# Patient Record
Sex: FEMALE | Race: WHITE | HISPANIC OR LATINO | Employment: FULL TIME | URBAN - METROPOLITAN AREA
[De-identification: names, ages, dates, MRNs, and addresses within clinical notes are randomized per-mention and may not be internally consistent; named-entity substitution may affect disease eponyms.]

---

## 2017-01-02 RX ORDER — DROSPIRENONE AND ETHINYL ESTRADIOL 0.03MG-3MG
1 KIT ORAL DAILY
Qty: 84 TABLET | Refills: 3 | Status: SHIPPED | OUTPATIENT
Start: 2017-01-02 | End: 2017-12-11

## 2017-01-02 NOTE — TELEPHONE ENCOUNTER
HARIS 3-0.03MG TABS      Last Written Prescription Date: 10/12/2016  Last Fill Quantity: 84,  # refills: 0   Last Office Visit with [facility], [facility] or [facility] prescribing provider: 12/9/2015

## 2017-01-20 DIAGNOSIS — F41.9 ANXIETY: Primary | ICD-10-CM

## 2017-01-20 NOTE — TELEPHONE ENCOUNTER
Lorazepam      Last Written Prescription Date: 1/14/16  Last Fill Quantity: 20,  # refills: 0   Last Office Visit with [facility] or [facility] prescribing provider: 12/9/15    Michelle Quintana, PharmD  [facility]  On behalf of [facility]

## 2017-01-23 RX ORDER — LORAZEPAM 1 MG/1
1 TABLET ORAL EVERY 8 HOURS PRN
Qty: 20 TABLET | Refills: 0 | Status: SHIPPED | OUTPATIENT
Start: 2017-01-23 | End: 2020-08-19

## 2017-06-14 DIAGNOSIS — F41.1 GAD (GENERALIZED ANXIETY DISORDER): ICD-10-CM

## 2017-06-14 NOTE — TELEPHONE ENCOUNTER
Wellbutrin       Last Written Prescription Date: 12/26/2016  Last Fill Quantity: 90; # refills: 1  Last Office Visit with [facility], [facility] or [facility] prescribing provider:  12/9/2015        Last PHQ-9 score on record=   PHQ-9 SCORE 6/29/2016   Total Score -   Total Score MyChart -   Total Score 1       No results found for: AST  No results found for: ALT

## 2017-06-14 NOTE — TELEPHONE ENCOUNTER
buPROPion (WELLBUTRIN XL) 300 MG 24 hr tablet       Last Written Prescription Date: 12/26/2016  Last Fill Quantity: 90 tablet; # refills: 1  Last Office Visit with [facility], [facility] or [facility] prescribing provider:  12/9/2015        Last PHQ-9 score on record=   PHQ-9 SCORE 6/29/2016   Total Score -   Total Score MyChart -   Total Score 1       No results found for: AST  No results found for: ALT

## 2017-06-15 NOTE — TELEPHONE ENCOUNTER
Due for an OV     Called # [phone]    Pt stated she will call the clinic back to schedule this appointment     Awaiting for appointment to be made - once made Rn can fill for 30 days     Amita Livingston RN, BSN  [facility]

## 2017-06-22 RX ORDER — BUPROPION HYDROCHLORIDE 300 MG/1
300 TABLET ORAL EVERY MORNING
Qty: 30 TABLET | Refills: 0 | Status: SHIPPED | OUTPATIENT
Start: 2017-06-22 | End: 2017-07-13

## 2017-06-22 NOTE — TELEPHONE ENCOUNTER
Next 5 appointments (look out 90 days)     Jul 13, 2017 10:00 AM CDT   MyChart Physical Adult with Karen Weiler, MD   [facility]    [address] [address] [phone]                Due for an Office visit for further refills, only fill for 30 days     Amita Livingston RN, BSN  [facility]

## 2017-07-13 ENCOUNTER — OFFICE VISIT (OUTPATIENT)
Dept: FAMILY MEDICINE | Facility: CLINIC | Age: 27
End: 2017-07-13
Payer: COMMERCIAL

## 2017-07-13 VITALS
WEIGHT: 246 LBS | BODY MASS INDEX: 38.61 KG/M2 | DIASTOLIC BLOOD PRESSURE: 76 MMHG | HEART RATE: 92 BPM | TEMPERATURE: 97.2 F | SYSTOLIC BLOOD PRESSURE: 129 MMHG | HEIGHT: 67 IN

## 2017-07-13 DIAGNOSIS — F32.0 MILD MAJOR DEPRESSION (H): ICD-10-CM

## 2017-07-13 DIAGNOSIS — F41.1 GAD (GENERALIZED ANXIETY DISORDER): ICD-10-CM

## 2017-07-13 DIAGNOSIS — Z13.6 CARDIOVASCULAR SCREENING; LDL GOAL LESS THAN 160: ICD-10-CM

## 2017-07-13 DIAGNOSIS — Z00.00 ROUTINE GENERAL MEDICAL EXAMINATION AT A HEALTH CARE FACILITY: Primary | ICD-10-CM

## 2017-07-13 DIAGNOSIS — E66.01 MORBID OBESITY DUE TO EXCESS CALORIES (H): ICD-10-CM

## 2017-07-13 LAB
ERYTHROCYTE [DISTWIDTH] IN BLOOD BY AUTOMATED COUNT: 13.3 % (ref 10–15)
HCT VFR BLD AUTO: 39 % (ref 35–47)
HGB BLD-MCNC: 12.6 G/DL (ref 11.7–15.7)
MCH RBC QN AUTO: 29.1 PG (ref 26.5–33)
MCHC RBC AUTO-ENTMCNC: 32.3 G/DL (ref 31.5–36.5)
MCV RBC AUTO: 90 FL (ref 78–100)
PLATELET # BLD AUTO: 227 10E9/L (ref 150–450)
RBC # BLD AUTO: 4.33 10E12/L (ref 3.8–5.2)
VIT B12 SERPL-MCNC: 911 PG/ML (ref 193–986)
WBC # BLD AUTO: 9.5 10E9/L (ref 4–11)

## 2017-07-13 PROCEDURE — 80061 LIPID PANEL: CPT | Performed by: FAMILY MEDICINE

## 2017-07-13 PROCEDURE — 80048 BASIC METABOLIC PNL TOTAL CA: CPT | Performed by: FAMILY MEDICINE

## 2017-07-13 PROCEDURE — 85027 COMPLETE CBC AUTOMATED: CPT | Performed by: FAMILY MEDICINE

## 2017-07-13 PROCEDURE — 82607 VITAMIN B-12: CPT | Performed by: FAMILY MEDICINE

## 2017-07-13 PROCEDURE — 99395 PREV VISIT EST AGE 18-39: CPT | Performed by: FAMILY MEDICINE

## 2017-07-13 PROCEDURE — 36415 COLL VENOUS BLD VENIPUNCTURE: CPT | Performed by: FAMILY MEDICINE

## 2017-07-13 PROCEDURE — 84443 ASSAY THYROID STIM HORMONE: CPT | Performed by: FAMILY MEDICINE

## 2017-07-13 RX ORDER — BUPROPION HYDROCHLORIDE 300 MG/1
300 TABLET ORAL EVERY MORNING
Qty: 90 TABLET | Refills: 3 | Status: SHIPPED | OUTPATIENT
Start: 2017-07-13 | End: 2018-05-30

## 2017-07-13 ASSESSMENT — ANXIETY QUESTIONNAIRES
IF YOU CHECKED OFF ANY PROBLEMS ON THIS QUESTIONNAIRE, HOW DIFFICULT HAVE THESE PROBLEMS MADE IT FOR YOU TO DO YOUR WORK, TAKE CARE OF THINGS AT HOME, OR GET ALONG WITH OTHER PEOPLE: SOMEWHAT DIFFICULT
7. FEELING AFRAID AS IF SOMETHING AWFUL MIGHT HAPPEN: NOT AT ALL
GAD7 TOTAL SCORE: 2
2. NOT BEING ABLE TO STOP OR CONTROL WORRYING: NOT AT ALL
6. BECOMING EASILY ANNOYED OR IRRITABLE: SEVERAL DAYS
5. BEING SO RESTLESS THAT IT IS HARD TO SIT STILL: NOT AT ALL
3. WORRYING TOO MUCH ABOUT DIFFERENT THINGS: NOT AT ALL
1. FEELING NERVOUS, ANXIOUS, OR ON EDGE: SEVERAL DAYS

## 2017-07-13 ASSESSMENT — PATIENT HEALTH QUESTIONNAIRE - PHQ9: 5. POOR APPETITE OR OVEREATING: NOT AT ALL

## 2017-07-13 NOTE — PROGRESS NOTES
SUBJECTIVE:   CC: Nicole Sinclair is an 27 year old woman who presents for preventive health visit.     Physical   Annual:     Getting at least 3 servings of Calcium per day::  Yes    Bi-annual eye exam::  NO    Dental care twice a year::  Yes    Sleep apnea or symptoms of sleep apnea::  None    Diet::  Vegetarian/vegan    Frequency of exercise::  2-3 days/week    Duration of exercise::  45-60 minutes    Taking medications regularly::  Yes    Medication side effects::  Other    Additional concerns today::  No    Depression Followup    Status since last visit: Stable     See PHQ-9 for current symptoms.  Other associated symptoms: None    Complicating factors:   Significant life event:  No   Current substance abuse:  None  Anxiety or Panic symptoms:  No    PHQ-9  English  PHQ-9   Any Language    Answers for HPI/ROS submitted by the patient on 7/11/2017   PHQ-2 Score: 0    Today's PHQ-2 Score:   PHQ-2 ( 1999 [employer]) 7/11/2017   Q1: Little interest or pleasure in doing things Not at all   Q2: Feeling down, depressed or hopeless Not at all   PHQ-2 Score 0     Abuse: Current or Past(Physical, Sexual or Emotional)- No  Do you feel safe in your environment - Yes    Social History   Substance Use Topics     Smoking status: Never Smoker     Smokeless tobacco: Never Used     Alcohol use Yes      Comment: rare     The patient does not drink >3 drinks per day nor >7 drinks per week.    Reviewed orders with patient.  Reviewed health maintenance and updated orders accordingly - Yes      Mammogram not appropriate for this patient based on age.    Pertinent mammograms are reviewed under the imaging tab.  History of abnormal Pap smear: NO - age 21-29 PAP every 3 years recommended    Today's visit (7/13/17):  Pt has been feeling good.  Would like to have her B12 level checked. Stopped eating meat about a year ago. Feels much better.     Started seeing a therapist 1/2016, and feels great.  Feels like her anxiety is much better. Has  "lost 20 lbs. Feeling good on 300mg of Wellbutrin. No SI/HI.  Having sleep issues, but not wanting to do anything for at this time.    Is on OCP's.  Periods are normal.   No vaginal discharge.  No STD concerns.     Reviewed and updated as needed this visit by clinical staff  Tobacco  Allergies  Meds  Med Hx  Surg Hx  Fam Hx  Soc Hx        Reviewed and updated as needed this visit by Provider        ROS:  C: NEGATIVE for fever, chills, change in weight  I: NEGATIVE for worrisome rashes, moles or lesions  E: NEGATIVE for vision changes or irritation  ENT: NEGATIVE for ear, mouth and throat problems  R: NEGATIVE for significant cough or SOB  B: NEGATIVE for masses, tenderness or discharge  CV: NEGATIVE for chest pain, palpitations or peripheral edema  GI: NEGATIVE for nausea, abdominal pain, heartburn, or change in bowel habits  : NEGATIVE for unusual urinary or vaginal symptoms. Periods are regular.  M: NEGATIVE for significant arthralgias or myalgia  N: NEGATIVE for weakness, dizziness or paresthesias  P: NEGATIVE for changes in mood or affect    Problem list, Medication list, Allergies, and Medical/Social/Surgical histories reviewed in [facility] and updated as appropriate.  Labs reviewed in EPIC   OBJECTIVE:   /76  Pulse 92  Temp 97.2  F (36.2  C) (Tympanic)  Ht 1.689 m (5' 6.5\")  Wt 111.6 kg (246 lb)  LMP 06/29/2017  BMI 39.11 kg/m2  EXAM:  GENERAL: healthy, alert and no distress  EYES: Eyes grossly normal to inspection, PERRL and conjunctivae and sclerae normal  HENT: ear canals and TM's normal, nose and mouth without ulcers or lesions  NECK: no adenopathy, no asymmetry, masses, or scars and thyroid normal to palpation  RESP: lungs clear to auscultation - no rales, rhonchi or wheezes  BREAST: normal without masses, tenderness or nipple discharge and no palpable axillary masses or adenopathy  CV: regular rate and rhythm, normal S1 S2, no S3 or S4, no murmur, click or rub, no peripheral edema and " "peripheral pulses strong  ABDOMEN: soft, nontender, no hepatosplenomegaly, no masses and bowel sounds normal  MS: no gross musculoskeletal defects noted, no edema  SKIN: no suspicious lesions or rashes  NEURO: Normal strength and tone, mentation intact and speech normal  PSYCH: mentation appears normal, affect normal/bright    ASSESSMENT/PLAN:   (Z00.00) Routine general medical examination at a health care facility  (primary encounter diagnosis-Doing well  Plan: CBC with platelets, Basic metabolic panel, TSH         with free T4 reflex          (F32.0) Mild major depression (H)  Comment: Doing much better. Working with a therapist that is helpful.  Continue with Wellbutrin    (E66.01) Obesity  Comment: Stable. Working with therapist about eating habits. Has started Kickboxing. Has lost 20 lbs over past several months  Plan: Vitamin B12            (F41.1) FRANK (generalized anxiety disorder)  Comment: Stable on dose of Wellbutrin. Will continue  Plan: buPROPion (WELLBUTRIN XL) 300 MG 24 hr tablet          (Z13.6) CARDIOVASCULAR SCREENING; LDL GOAL LESS THAN 160  Plan: Lipid panel reflex to direct LDL          COUNSELING:  Reviewed preventive health counseling, as reflected in patient instructions       Regular exercise       Healthy diet/nutrition       Contraception     reports that she has never smoked. She has never used smokeless tobacco.    Estimated body mass index is 39.11 kg/(m^2) as calculated from the following:    Height as of this encounter: 1.689 m (5' 6.5\").    Weight as of this encounter: 111.6 kg (246 lb).   Weight management plan: Discussed healthy diet and exercise guidelines and patient will follow up in 12 months in clinic to re-evaluate.    Counseling Resources:  ATP IV Guidelines  Pooled Cohorts Equation Calculator  Breast Cancer Risk Calculator  FRAX Risk Assessment  ICSI Preventive Guidelines  Dietary Guidelines for Americans, 2010  USDA's MyPlate  ASA Prophylaxis  Lung CA Screening    Loretta " Weiler, MD  [facility]

## 2017-07-13 NOTE — MR AVS SNAPSHOT
After Visit Summary   7/13/2017    Nicole Sinclair    MRN: [id number]           Patient Information     Date Of Birth          1990        Visit Information        Provider Department      7/13/2017 10:00 AM Weiler, Karen, MD [facility]        Today's Diagnoses     Routine general medical examination at a health care facility    -  1    Mild major depression (H)        Obesity        FRANK (generalized anxiety disorder)        CARDIOVASCULAR SCREENING; LDL GOAL LESS THAN 160          Care Instructions      Preventive Health Recommendations  Female Ages 26 - 39  Yearly exam:   See your health care provider every year in order to    Review health changes.     Discuss preventive care.      Review your medicines if you your doctor has prescribed any.    Until age 30: Get a Pap test every three years (more often if you have had an abnormal result).    After age 30: Talk to your doctor about whether you should have a Pap test every 3 years or have a Pap test with HPV screening every 5 years.   You do not need a Pap test if your uterus was removed (hysterectomy) and you have not had cancer.  You should be tested each year for STDs (sexually transmitted diseases), if you're at risk.   Talk to your provider about how often to have your cholesterol checked.  If you are at risk for diabetes, you should have a diabetes test (fasting glucose).  Shots: Get a flu shot each year. Get a tetanus shot every 10 years.   Nutrition:     Eat at least 5 servings of fruits and vegetables each day.    Eat whole-grain bread, whole-wheat pasta and brown rice instead of white grains and rice.    Talk to your provider about Calcium and Vitamin D.     Lifestyle    Exercise at least 150 minutes a week (30 minutes a day, 5 days of the week). This will help you control your weight and prevent disease.    Limit alcohol to one drink per day.    No smoking.     Wear sunscreen to prevent skin cancer.    See your dentist  "every six months for an exam and cleaning.            Follow-ups after your visit        Who to contact     If you have questions or need follow up information about today's clinic visit or your schedule please contact [facility] directly at [phone].  Normal or non-critical lab and imaging results will be communicated to you by [employer], letter or phone within 4 business days after the clinic has received the results. If you do not hear from us within 7 days, please contact the clinic through [employer] or phone. If you have a critical or abnormal lab result, we will notify you by phone as soon as possible.  Submit refill requests through [employer]oked or call your pharmacy and they will forward the refill request to us. Please allow 3 business days for your refill to be completed.          Additional Information About Your Visit        nooked Information     nooked gives you secure access to your electronic health record. If you see a primary care provider, you can also send messages to your care team and make appointments. If you have questions, please call your primary care clinic.  If you do not have a primary care provider, please call [phone] and they will assist you.        Care EveryWhere ID     This is your Care EveryWhere ID. This could be used by other organizations to access your [facility] medical records  [id number]        Your Vitals Were     Pulse Temperature Height Last Period BMI (Body Mass Index)       92 97.2  F (36.2  C) (Tympanic) 5' 6.5\" (1.689 m) 06/29/2017 39.11 kg/m2        Blood Pressure from Last 3 Encounters:   07/13/17 129/76   12/09/15 114/72   11/13/15 116/72    Weight from Last 3 Encounters:   07/13/17 246 lb (111.6 kg)   12/09/15 247 lb (112 kg)   11/13/15 259 lb (117.5 kg)              We Performed the Following     Basic metabolic panel     CBC with platelets     Lipid panel reflex to direct LDL     TSH with free T4 reflex     Vitamin B12          Where to get your " medicines      These medications were sent to [facility] - [place], [place] - [address]  [address] , [facility] [address]    Hours:  Mon-Fri 8:30am-5:00pm Toll Free [phone] Phone:  [phone]     buPROPion 300 MG 24 hr tablet          Primary Care Provider Office Phone # Fax #    Karen Weiler, [phone]       [facility] [address]  [facility] [address]        Equal Access to Services     [facility]: Hadii aad ku hadasho Soomaali, waaxda luqadaha, qaybta kaalmada adeegyada, waxalicia samson hayaan james reddy . So [facility] [place] [phone].    ATENCIÓN: Si habla español, tiene a landis disposición servicios gratuitos de asistencia lingüística. [facility] [phone].    We comply with applicable federal civil rights laws and [place] laws. We do not discriminate on the basis of race, color, national origin, age, disability sex, sexual orientation or gender identity.            Thank you!     Thank you for choosing [facility]  for your care. Our goal is always to provide you with excellent care. Hearing back from our patients is one way we can continue to improve our services. Please take a few minutes to complete the written survey that you may receive in the mail after your visit with us. Thank you!             Your Updated Medication List - Protect others around you: Learn how to safely use, store and throw away your medicines at www.disposemymeds.org.          This list is accurate as of: 7/13/17 11:39 AM.  Always use your most recent med list.                   Brand Name Dispense Instructions for use Diagnosis    ALLEGRA 180 MG tablet   Generic drug:  fexofenadine      Take 1 tablet by mouth daily.        buPROPion 300 MG 24 hr tablet    WELLBUTRIN XL    90 tablet    Take 1 tablet (300 mg) by mouth every morning    FRANK (generalized anxiety disorder)       CALCIUM-VITAMIN D PO      Take 1 tablet by mouth 2 times  daily. 200 mg calcium, 400 IU vitamin D        cyclobenzaprine 10 MG tablet    FLEXERIL    30 tablet    Take 0.5-1 tablet by mouth 3 times daily as needed for muscle spasms. 1/2 tab during the day and 1 tab at bedtime as needed for muscle spasms    Jaw pain       drospirenone-ethinyl estradiol 3-0.03 MG per tablet    HARIS    84 tablet    Take 1 tablet by mouth daily    Contraception       LORazepam 1 MG tablet    ATIVAN    20 tablet    Take 1 tablet (1 mg) by mouth every 8 hours as needed for anxiety    Anxiety       VITAMIN D PO      Take 2,000 Units by mouth daily.

## 2017-07-14 ASSESSMENT — ANXIETY QUESTIONNAIRES: GAD7 TOTAL SCORE: 2

## 2017-07-14 ASSESSMENT — PATIENT HEALTH QUESTIONNAIRE - PHQ9: SUM OF ALL RESPONSES TO PHQ QUESTIONS 1-9: 4

## 2017-07-15 LAB
ANION GAP SERPL CALCULATED.3IONS-SCNC: 10 MMOL/L (ref 3–14)
BUN SERPL-MCNC: 8 MG/DL (ref 7–30)
CALCIUM SERPL-MCNC: 9.4 MG/DL (ref 8.5–10.1)
CHLORIDE SERPL-SCNC: 105 MMOL/L (ref 94–109)
CHOLEST SERPL-MCNC: 192 MG/DL
CO2 SERPL-SCNC: 24 MMOL/L (ref 20–32)
CREAT SERPL-MCNC: 0.73 MG/DL (ref 0.52–1.04)
GFR SERPL CREATININE-BSD FRML MDRD: NORMAL ML/MIN/1.7M2
GLUCOSE SERPL-MCNC: 81 MG/DL (ref 70–99)
HDLC SERPL-MCNC: 50 MG/DL
LDLC SERPL CALC-MCNC: 82 MG/DL
NONHDLC SERPL-MCNC: 142 MG/DL
POTASSIUM SERPL-SCNC: 4.1 MMOL/L (ref 3.4–5.3)
SODIUM SERPL-SCNC: 139 MMOL/L (ref 133–144)
TRIGL SERPL-MCNC: 300 MG/DL
TSH SERPL DL<=0.005 MIU/L-ACNC: 2.25 MU/L (ref 0.4–4)

## 2017-07-17 PROBLEM — E78.1 HYPERTRIGLYCERIDEMIA: Status: ACTIVE | Noted: 2017-07-17

## 2017-07-17 NOTE — PROGRESS NOTES
Dear Nicole,    -Kidney function is normal (Cr, GFR), Sodium is normal, Potassium is normal, Calcium is normal, Glucose is normal (diabetes screening test).   -LDL(bad) cholesterol level is normal.  -HDL(good) cholesterol level is normal.  -Triglycerides are elevated which can increase your heart disease risk.  A diet high in fat and simple carbohydrates, genetics and being overweight can contribute to this. A low carbohydrate diet (diet low in bread, rice, cereal, pasta, sweets, soda, alcohol, etc.), regular exercise, and weight loss is recommended to improve the triglyceride level. Rechecking your cholesterol in 12 months is recommended.  -TSH (thyroid stimulating hormone) level is normal which indicates normal thyroid function.  -Normal red blood cell (hgb) levels, normal white blood cell count and normal platelet levels.  -Vitamin B12 level is normal    If you have further questions about the interpretation of your labs, labtestsonline.org is a good website to check out for further information.    Please contact the clinic if you have additional questions.  Thank you.    Sincerely,    Melissa Mackay PA-C  Physician extender for Dr. Karen Weiler

## 2017-12-11 DIAGNOSIS — Z30.41 ENCOUNTER FOR SURVEILLANCE OF CONTRACEPTIVE PILLS: Primary | ICD-10-CM

## 2017-12-13 RX ORDER — DROSPIRENONE AND ETHINYL ESTRADIOL 0.03MG-3MG
1 KIT ORAL DAILY
Qty: 84 TABLET | Refills: 1 | Status: SHIPPED | OUTPATIENT
Start: 2017-12-13 | End: 2018-05-30

## 2017-12-13 NOTE — TELEPHONE ENCOUNTER
Name of caller: Nicole  Relationship of Patient: Self    Reason for Call: Patient would like a refill of her drospirenone-ethinyl estradiol (HARIS) 3-0.03 MG per tablet, I did inform her she needs to establish care with a new provider and offered to schedule her. However, she said she just wants us to put in a request and see if someone will refill it.     Best phone number to reach pt at is: [phone]  Ok to leave a message with medical info? Yes    Pharmacy preferred (if calling for a refill): [facility]    Rhiannon Isaacs  [facility] Workforce [facility]-Patient Representative

## 2017-12-13 NOTE — TELEPHONE ENCOUNTER
please call patient with below information and advise she will need to follow up/establish care with new provider for ongoing care and refills. Marci Liao R.N.

## 2017-12-13 NOTE — TELEPHONE ENCOUNTER
Requested Prescriptions   Pending Prescriptions Disp Refills     drospirenone-ethinyl estradiol (HARIS) 3-0.03 MG per tablet  See notes.  Last Written Prescription Date:  1/2/2017  Last Fill Quantity: 84 tablet,  # refills: 3   Last Office Visit with [facility], [facility] or [facility] prescribing provider:  7/13/2017   Future Office Visit:      84 tablet      Sig: Take 1 tablet by mouth daily    Contraceptives Protocol Passed    12/13/2017 10:27 AM       Passed - Patient is not a current smoker if age is 35 or older       Passed - Recent or future visit with authorizing provider's specialty    Patient had office visit in the last year or has a visit in the next 30 days with authorizing provider.  See chart review.          Passed - No active pregnancy on record       Passed - No positive pregnancy test in past 12 months

## 2017-12-13 NOTE — TELEPHONE ENCOUNTER
Ok to extend another 6 month as she had CPE in July. Would have her follow-up for routine physical though again at that time for on-going care/scripts.  Electronically Signed By: Marquita Gonzalez PA-C

## 2018-05-30 ENCOUNTER — OFFICE VISIT (OUTPATIENT)
Dept: FAMILY MEDICINE | Facility: CLINIC | Age: 28
End: 2018-05-30
Payer: COMMERCIAL

## 2018-05-30 VITALS
WEIGHT: 269 LBS | BODY MASS INDEX: 42.22 KG/M2 | OXYGEN SATURATION: 98 % | SYSTOLIC BLOOD PRESSURE: 110 MMHG | DIASTOLIC BLOOD PRESSURE: 64 MMHG | TEMPERATURE: 98.1 F | HEART RATE: 118 BPM | HEIGHT: 67 IN

## 2018-05-30 DIAGNOSIS — Z30.41 ENCOUNTER FOR SURVEILLANCE OF CONTRACEPTIVE PILLS: ICD-10-CM

## 2018-05-30 DIAGNOSIS — F32.5 MAJOR DEPRESSION IN COMPLETE REMISSION (H): Primary | ICD-10-CM

## 2018-05-30 DIAGNOSIS — E66.01 OBESITY, MORBID (H): ICD-10-CM

## 2018-05-30 DIAGNOSIS — F41.1 GAD (GENERALIZED ANXIETY DISORDER): ICD-10-CM

## 2018-05-30 DIAGNOSIS — N92.6 IRREGULAR PERIODS: ICD-10-CM

## 2018-05-30 PROCEDURE — 99214 OFFICE O/P EST MOD 30 MIN: CPT | Performed by: PHYSICIAN ASSISTANT

## 2018-05-30 RX ORDER — BUPROPION HYDROCHLORIDE 300 MG/1
300 TABLET ORAL EVERY MORNING
Qty: 90 TABLET | Refills: 3 | Status: SHIPPED | OUTPATIENT
Start: 2018-05-30 | End: 2019-04-26

## 2018-05-30 RX ORDER — DROSPIRENONE AND ETHINYL ESTRADIOL 0.03MG-3MG
1 KIT ORAL DAILY
Qty: 84 TABLET | Refills: 3 | Status: SHIPPED | OUTPATIENT
Start: 2018-05-30 | End: 2018-06-04

## 2018-05-30 ASSESSMENT — ANXIETY QUESTIONNAIRES
7. FEELING AFRAID AS IF SOMETHING AWFUL MIGHT HAPPEN: NOT AT ALL
5. BEING SO RESTLESS THAT IT IS HARD TO SIT STILL: NOT AT ALL
2. NOT BEING ABLE TO STOP OR CONTROL WORRYING: NOT AT ALL
6. BECOMING EASILY ANNOYED OR IRRITABLE: SEVERAL DAYS
1. FEELING NERVOUS, ANXIOUS, OR ON EDGE: SEVERAL DAYS
IF YOU CHECKED OFF ANY PROBLEMS ON THIS QUESTIONNAIRE, HOW DIFFICULT HAVE THESE PROBLEMS MADE IT FOR YOU TO DO YOUR WORK, TAKE CARE OF THINGS AT HOME, OR GET ALONG WITH OTHER PEOPLE: NOT DIFFICULT AT ALL
GAD7 TOTAL SCORE: 2
3. WORRYING TOO MUCH ABOUT DIFFERENT THINGS: NOT AT ALL

## 2018-05-30 ASSESSMENT — PATIENT HEALTH QUESTIONNAIRE - PHQ9: 5. POOR APPETITE OR OVEREATING: NOT AT ALL

## 2018-05-30 NOTE — MR AVS SNAPSHOT
After Visit Summary   5/30/2018    Nicole Sinclair    MRN: [id number]           Patient Information     Date Of Birth          1990        Visit Information        Provider Department      5/30/2018 10:40 AM Marquita Gonzalez PA-C [facility] [place]        Today's Diagnoses     Obesity, morbid (H)    -  1    FRANK (generalized anxiety disorder)        Encounter for surveillance of contraceptive pills        Major depression in complete remission (H)          Care Instructions    So glad things are going well.  Continue medication and therapy without changes.  Return for physical in November.  Check labs/b12 at time of same.     Electronically Signed By: Marquita Gonzalez PA-C            Follow-ups after your visit        Additional Services     NUTRITION REFERRAL       Your provider has referred you to: FMG: [facility] [place] [phone]   [email or web address]  FMG: [facility], [phone]  [email or web address]   FMG: [place] [facility] [phone]   [email or web address]  FMG: [place] [facility] [place] [phone]   [email or web address]  FMG: [facility] [phone]   [email or web address]  UMP: [facility] (on call location) [facility] [phone]   [email or web address]    Please be aware that coverage of these services is subject to the terms and limitations of your health insurance plan.  Call member services at your health plan with any benefit or coverage questions.      Please bring the following with you to your appointment:    (1) This referral request  (2) Any documents given to you regarding this referral  (3) Any specific questions you have about diet and/or food choices                  Follow-up notes from  "your care team     Return in about 6 months (around 11/30/2018) for Routine Visit.      Who to contact     If you have questions or need follow up information about today's clinic visit or your schedule please contact [facility] [place] directly at [phone].  Normal or non-critical lab and imaging results will be communicated to you by [employer], letter or phone within 4 business days after the clinic has received the results. If you do not hear from us within 7 days, please contact the clinic through [employer] or phone. If you have a critical or abnormal lab result, we will notify you by phone as soon as possible.  Submit refill requests through [employer] or call your pharmacy and they will forward the refill request to us. Please allow 3 business days for your refill to be completed.          Additional Information About Your Visit        [employer]ralignhart Information     [employer] gives you secure access to your electronic health record. If you see a primary care provider, you can also send messages to your care team and make appointments. If you have questions, please call your primary care clinic.  If you do not have a primary care provider, please call [phone] and they will assist you.        Care EveryWhere ID     This is your Care EveryWhere ID. This could be used by other organizations to access your [facility] medical records  [id number]        Your Vitals Were     Pulse Temperature Height Pulse Oximetry Breastfeeding? BMI (Body Mass Index)    118 98.1  F (36.7  C) (Oral) 5' 6.5\" (1.689 m) 98% No 42.77 kg/m2       Blood Pressure from Last 3 Encounters:   05/30/18 110/64   07/13/17 129/76   12/09/15 114/72    Weight from Last 3 Encounters:   05/30/18 269 lb (122 kg)   07/13/17 246 lb (111.6 kg)   12/09/15 247 lb (112 kg)              We Performed the Following     DEPRESSION ACTION PLAN (DAP)     NUTRITION REFERRAL          Today's Medication Changes          These changes are accurate as of 5/30/18 11:13 AM.  " If you have any questions, ask your nurse or doctor.               Stop taking these medicines if you haven't already. Please contact your care team if you have questions.     ALLEGRA 180 MG tablet   Generic drug:  fexofenadine   Stopped by:  Marquita Gonzalez PA-C           cyclobenzaprine 10 MG tablet   Commonly known as:  FLEXERIL   Stopped by:  Marquita Gonzalez PA-C                Where to get your medicines      These medications were sent to [facility] - [place], [place] - [address] [place] [address]  [address] [facility] [address]    Hours:  Mon-Fri 8:30am-5:00pm Toll Free [phone] Phone:  [phone]     buPROPion 300 MG 24 hr tablet    drospirenone-ethinyl estradiol 3-0.03 MG per tablet                Primary Care Provider Office Phone # Fax #    Karen Weiler, [phone] [address]  [place] [address]        Equal Access to Services     [facility]: Hadii aad ku hadasho Soomaali, waaxda luqadaha, qaybta kaalmada adeegyada, waxay jonoin haycarlee reddy . So [facility] [phone].    ATENCIÓN: Si habla español, tiene a landis disposición servicios gratuitos de asistencia lingüística. Llame al [phone].    We comply with applicable federal civil rights laws and [place] laws. We do not discriminate on the basis of race, color, national origin, age, disability, sex, sexual orientation, or gender identity.            Thank you!     Thank you for choosing [facility]  for your care. Our goal is always to provide you with excellent care. Hearing back from our patients is one way we can continue to improve our services. Please take a few minutes to complete the written survey that you may receive in the mail after your visit with us. Thank you!             Your Updated Medication List - Protect others around you: Learn how to safely use, store and throw away your medicines at www.disposemymeds.org.           This list is accurate as of 5/30/18 11:13 AM.  Always use your most recent med list.                   Brand Name Dispense Instructions for use Diagnosis    B-12 1000 MCG Caps      Take 1 capsule by mouth daily        buPROPion 300 MG 24 hr tablet    WELLBUTRIN XL    90 tablet    Take 1 tablet (300 mg) by mouth every morning    FRANK (generalized anxiety disorder)       CALCIUM-VITAMIN D PO      Take 1 tablet by mouth 2 times daily. 200 mg calcium, 400 IU vitamin D        drospirenone-ethinyl estradiol 3-0.03 MG per tablet    HARIS    84 tablet    Take 1 tablet by mouth daily    Encounter for surveillance of contraceptive pills       LORazepam 1 MG tablet    ATIVAN    20 tablet    Take 1 tablet (1 mg) by mouth every 8 hours as needed for anxiety    Anxiety       VITAMIN D PO      Take 2,000 Units by mouth daily.

## 2018-05-30 NOTE — PROGRESS NOTES
"  SUBJECTIVE:   Nicole Sinclair is a 28 year old female who presents to clinic today for the following health issues:      New Patient/Transfer of Care    Depression and anxiety Follow-up;  Has taken wellbutrin since 2009 - has been on 300mg dose for past few years and done very well with this. Does not feel she requires any medication adjustments at all.   Reports feels like she had a relapse in fall after finding out she was involved in a loan scam when trying to repay her student loans. Now this is figured out and stress is better.  [facility] Feels this was very helpful and good connection with this person. Wishes she would have sought counseling/therapy sooner.    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  Other associated symptoms: None    Complicating factors:   Significant life event:  YES, she states lots of stress this fall and winter   Current substance abuse:  None  Anxiety or Panic symptoms:  No      PHQ-9 6/29/2016 7/13/2017 5/30/2018   Total Score 1 4 3   Q9: Suicide Ideation Not at all Not at all Not at all       PHQ-9  English  PHQ-9   Any Language  Suicide Assessment Five-step Evaluation and Treatment (SAFE-T)    Amount of exercise or physical activity: None    Problems taking medications regularly: No    Medication side effects: rarely will have a hand tremor, but this is not bothersome enough for her to stop med    Diet: regular (no restrictions)      2) Re-fill OCPs. Has taken for irregular periods for \"a long time.\" Was tested for PCOS and reports this was negative. Since on OCPs periods are regular and things are going well. Had been switched to a different brand at one point and this impacted her mood. Has done well on jaqui since then. LMP: 5/3.  No current sexual partner.   Non-smoker.  Physical is due in November and " will return for pap/breast exam at that time.     Problem list and histories reviewed & adjusted, as indicated.  Additional history: as documented    Patient Active Problem List   Diagnosis     Mild major depression (H)     CARDIOVASCULAR SCREENING; LDL GOAL LESS THAN 160     Generalized anxiety disorder     Obesity, morbid (H)     Hypertriglyceridemia     Major depression in complete remission (H)     Past Surgical History:   Procedure Laterality Date     NO HISTORY OF SURGERY         Social History   Substance Use Topics     Smoking status: Never Smoker     Smokeless tobacco: Never Used     Alcohol use Yes      Comment: rare     Family History   Problem Relation Age of Onset     GASTROINTESTINAL DISEASE Mother      born [birth year] ulcerative colitis      Hypertension Mother      with kidney disease     Lipids Father      Family History Negative Sister      Hypertension Maternal Grandmother      Family History Negative Maternal Grandfather      Thyroid Disease Paternal Grandmother       80yo pulmonary fibrosis (smoker)     HEART DISEASE Paternal Grandfather       56yo MI after MVA         Current Outpatient Prescriptions   Medication Sig Dispense Refill     buPROPion (WELLBUTRIN XL) 300 MG 24 hr tablet Take 1 tablet (300 mg) by mouth every morning 90 tablet 3     CALCIUM-VITAMIN D PO Take 1 tablet by mouth 2 times daily. 200 mg calcium, 400 IU vitamin D       Cholecalciferol (VITAMIN D PO) Take 2,000 Units by mouth daily.       Cyanocobalamin (B-12) 1000 MCG CAPS Take 1 capsule by mouth daily       drospirenone-ethinyl estradiol (HARIS) 3-0.03 MG per tablet Take 1 tablet by mouth daily 84 tablet 3     LORazepam (ATIVAN) 1 MG tablet Take 1 tablet (1 mg) by mouth every 8 hours as needed for anxiety (Patient not taking: Reported on 2018) 20 tablet 0     [DISCONTINUED] buPROPion (WELLBUTRIN XL) 300 MG 24 hr tablet Take 1 tablet (300 mg) by mouth every morning 90 tablet 3     [DISCONTINUED]  "drospirenone-ethinyl estradiol (HARIS) 3-0.03 MG per tablet Take 1 tablet by mouth daily 84 tablet 1     No Known Allergies    Reviewed and updated as needed this visit by clinical staff  Tobacco  Allergies  Meds  Med Hx  Surg Hx  Fam Hx  Soc Hx      Reviewed and updated as needed this visit by Provider  Tobacco  Allergies  Meds  Med Hx  Surg Hx  Fam Hx  Soc Hx        ROS:  Constitutional, psych systems are negative, except as otherwise noted.    OBJECTIVE:     /64 (BP Location: Right arm, Cuff Size: Adult Large)  Pulse 118  Temp 98.1  F (36.7  C) (Oral)  Ht 5' 6.5\" (1.689 m)  Wt 269 lb (122 kg)  SpO2 98%  Breastfeeding? No  BMI 42.77 kg/m2  Body mass index is 42.77 kg/(m^2).  GENERAL: healthy, alert and no distress  RESP: lungs clear to auscultation - no rales, rhonchi or wheezes  CV: regular rates and rhythm and no murmur, click or rub  PSYCH: mentation appears normal, affect normal/bright    Diagnostic Test Results:  See Flowsheets for PHQ/FRANK scores    ASSESSMENT/PLAN:       ICD-10-CM    1. Major depression in complete remission (H) F32.5 buPROPion (WELLBUTRIN XL) 300 MG 24 hr tablet     DEPRESSION ACTION PLAN (DAP)   2. FRANK (generalized anxiety disorder) F41.1 buPROPion (WELLBUTRIN XL) 300 MG 24 hr tablet   3. Encounter for surveillance of contraceptive pills Z30.41 drospirenone-ethinyl estradiol (HARIS) 3-0.03 MG per tablet   4. Obesity, morbid (H) E66.01 NUTRITION REFERRAL   Very stable on wellbutrin for anxiety and depression.  Pt has found immense help with therapy so will continue with this as planned as well.  Would like to consider further assistance with meal planning/weight loss so referred to nutritionist as well today.  Due for physical/pap in Nov. Complete labs at that time.   Risks/benefits/appropriate use of birth control reviewed as well.   See Patient Instructions  Pt in agreement with plan.   Patient Instructions   So glad things are going well.  Continue medication and " therapy without changes.  Return for physical in November.  Check labs/b12 at time of same.     Electronically Signed By: Marquita Gonzalez PA-C

## 2018-05-30 NOTE — LETTER
My Depression Action Plan  Name: Nicole Sinclair   Date of Birth 1990  Date: 5/30/2018    My doctor: Weiler, Karen   My clinic: [facility] [place]  [address]  [place] [address] [phone]          GREEN    ZONE   Good Control    What it looks like:     Things are going generally well. You have normal up s and down s. You may even feel depressed from time to time, but bad moods usually last less than a day.   What you need to do:  1. Continue to care for yourself (see self care plan)  2. Check your depression survival kit and update it as needed  3. Follow your physician s recommendations including any medication.  4. Do not stop taking medication unless you consult with your physician first.           YELLOW         ZONE Getting Worse    What it looks like:     Depression is starting to interfere with your life.     It may be hard to get out of bed; you may be starting to isolate yourself from others.    Symptoms of depression are starting to last most all day and this has happened for several days.     You may have suicidal thoughts but they are not constant.   What you need to do:     1. Call your care team, your response to treatment will improve if you keep your care team informed of your progress. Yellow periods are signs an adjustment may need to be made.     2. Continue your self-care, even if you have to fake it!    3. Talk to someone in your support network    4. Open up your depression survival kit           RED    ZONE Medical Alert - Get Help    What it looks like:     Depression is seriously interfering with your life.     You may experience these or other symptoms: You can t get out of bed most days, can t work or engage in other necessary activities, you have trouble taking care of basic hygiene, or basic responsibilities, thoughts of suicide or death that will not go away, self-injurious behavior.     What you need to do:  1. Call your care team and request a  same-day appointment. If they are not available (weekends or after hours) call your local crisis line, emergency room or 911.            Depression Self Care Plan / Survival Kit    Self-Care for Depression  Here s the deal. Your body and mind are really not as separate as most people think.  What you do and think affects how you feel and how you feel influences what you do and think. This means if you do things that people who feel good do, it will help you feel better.  Sometimes this is all it takes.  There is also a place for medication and therapy depending on how severe your depression is, so be sure to consult with your medical provider and/ or Behavioral Health Consultant if your symptoms are worsening or not improving.     In order to better manage my stress, I will:    Exercise  Get some form of exercise, every day. This will help reduce pain and release endorphins, the  feel good  chemicals in your brain. This is almost as good as taking antidepressants!  This is not the same as joining a gym and then never going! (they count on that by the way ) It can be as simple as just going for a walk or doing some gardening, anything that will get you moving.      Hygiene   Maintain good hygiene (Get out of bed in the morning, Make your bed, Brush your teeth, Take a shower, and Get dressed like you were going to work, even if you are unemployed).  If your clothes don't fit try to get ones that do.    Diet  I will strive to eat foods that are good for me, drink plenty of water, and avoid excessive sugar, caffeine, alcohol, and other mood-altering substances.  Some foods that are helpful in depression are: complex carbohydrates, B vitamins, flaxseed, fish or fish oil, fresh fruits and vegetables.    Psychotherapy  I agree to participate in Individual Therapy (if recommended).    Medication  If prescribed medications, I agree to take them.  Missing doses can result in serious side effects.  I understand that drinking  alcohol, or other illicit drug use, may cause potential side effects.  I will not stop my medication abruptly without first discussing it with my provider.    Staying Connected With Others  I will stay in touch with my friends, family members, and my primary care provider/team.    Use your imagination  Be creative.  We all have a creative side; it doesn t matter if it s oil painting, sand castles, or mud pies! This will also kick up the endorphins.    Witness Beauty  (AKA stop and smell the roses) Take a look outside, even in mid-winter. Notice colors, textures. Watch the squirrels and birds.     Service to others  Be of service to others.  There is always someone else in need.  By helping others we can  get out of ourselves  and remember the really important things.  This also provides opportunities for practicing all the other parts of the program.    Humor  Laugh and be silly!  Adjust your TV habits for less news and crime-drama and more comedy.    Control your stress  Try breathing deep, massage therapy, biofeedback, and meditation. Find time to relax each day.     My support system    Clinic Contact:  Phone number:    Contact 1:  Phone number:    Contact 2:  Phone number:    Yarsani/:  Phone number:    Therapist:  Phone number:    Local crisis center:    Phone number:    Other community support:  Phone number:

## 2018-05-30 NOTE — NURSING NOTE
"Chief Complaint   Patient presents with     Establish Care     transfer from Dr. Weiler     Refill Request    /64 (BP Location: Right arm, Cuff Size: Adult Large)  Pulse 118  Temp 98.1  F (36.7  C) (Oral)  Ht 5' 6.5\" (1.689 m)  Wt 269 lb (122 kg)  SpO2 98%  Breastfeeding? No  BMI 42.77 kg/m2 Body Mass Index is Body mass index is 42.77 kg/(m^2).  BP completed using cuff size : large right arm  Marlen Carrillo MA        "

## 2018-05-30 NOTE — PATIENT INSTRUCTIONS
So glad things are going well.  Continue medication and therapy without changes.  Return for physical in November.  Check labs/b12 at time of same.     Electronically Signed By: Marquita Gonzalez PA-C

## 2018-05-31 ASSESSMENT — PATIENT HEALTH QUESTIONNAIRE - PHQ9: SUM OF ALL RESPONSES TO PHQ QUESTIONS 1-9: 3

## 2018-05-31 ASSESSMENT — ANXIETY QUESTIONNAIRES: GAD7 TOTAL SCORE: 2

## 2018-06-04 ENCOUNTER — TELEPHONE (OUTPATIENT)
Dept: FAMILY MEDICINE | Facility: CLINIC | Age: 28
End: 2018-06-04

## 2018-06-04 DIAGNOSIS — Z30.41 ENCOUNTER FOR SURVEILLANCE OF CONTRACEPTIVE PILLS: ICD-10-CM

## 2018-06-04 DIAGNOSIS — N92.6 IRREGULAR PERIODS: ICD-10-CM

## 2018-06-04 RX ORDER — DROSPIRENONE AND ETHINYL ESTRADIOL 0.03MG-3MG
1 KIT ORAL DAILY
Qty: 84 TABLET | Refills: 3 | Status: SHIPPED | OUTPATIENT
Start: 2018-06-04 | End: 2019-04-26

## 2018-06-04 NOTE — TELEPHONE ENCOUNTER
"I spoke to Nicole she says when she has taken generic she has some really bad side effects. She says she has tried twice the generic and \"both times it ended badly.\"   Says she does not get the bad side effects from the brand name.  She is waiting to hear back from a supervisor from [place] pharmacy. I told her in the mean time I will resend RX to dispense as written and name brand only.     Marci Liao R.N.    "

## 2018-06-04 NOTE — TELEPHONE ENCOUNTER
Reason for Call:  Other Medication    Detailed comments: Nicole called she wants her birth control carley be changed to brand name HARIS, this is brand that she can take only.    Phone Number Patient can be reached at: Home number on file [phone] (home)    Best Time: anytime    Can we leave a detailed message on this number? YES    Call taken on 6/4/2018 at 9:49 AM by Marilee Duke

## 2019-02-15 ENCOUNTER — HEALTH MAINTENANCE LETTER (OUTPATIENT)
Age: 29
End: 2019-02-15

## 2019-02-23 ENCOUNTER — OFFICE VISIT (OUTPATIENT)
Dept: URGENT CARE | Facility: URGENT CARE | Age: 29
End: 2019-02-23
Payer: COMMERCIAL

## 2019-02-23 VITALS
TEMPERATURE: 98.8 F | OXYGEN SATURATION: 97 % | SYSTOLIC BLOOD PRESSURE: 112 MMHG | DIASTOLIC BLOOD PRESSURE: 80 MMHG | HEART RATE: 112 BPM

## 2019-02-23 DIAGNOSIS — R07.0 THROAT PAIN: ICD-10-CM

## 2019-02-23 DIAGNOSIS — R50.9 FEVER IN ADULT: ICD-10-CM

## 2019-02-23 DIAGNOSIS — J02.0 STREP THROAT: Primary | ICD-10-CM

## 2019-02-23 LAB
DEPRECATED S PYO AG THROAT QL EIA: ABNORMAL
FLUAV+FLUBV AG SPEC QL: NEGATIVE
FLUAV+FLUBV AG SPEC QL: NEGATIVE
SPECIMEN SOURCE: ABNORMAL
SPECIMEN SOURCE: NORMAL

## 2019-02-23 PROCEDURE — 87880 STREP A ASSAY W/OPTIC: CPT | Performed by: PHYSICIAN ASSISTANT

## 2019-02-23 PROCEDURE — 99213 OFFICE O/P EST LOW 20 MIN: CPT | Performed by: PHYSICIAN ASSISTANT

## 2019-02-23 PROCEDURE — 87804 INFLUENZA ASSAY W/OPTIC: CPT | Performed by: PHYSICIAN ASSISTANT

## 2019-02-23 RX ORDER — AMOXICILLIN 875 MG
875 TABLET ORAL 2 TIMES DAILY
Qty: 20 TABLET | Refills: 0 | Status: SHIPPED | OUTPATIENT
Start: 2019-02-23 | End: 2019-04-26

## 2019-02-23 ASSESSMENT — ENCOUNTER SYMPTOMS
COUGH: 0
SORE THROAT: 1
FEVER: 1
NAUSEA: 0
VOMITING: 0
DIARRHEA: 0

## 2019-02-23 NOTE — PROGRESS NOTES
SUBJECTIVE:   Nicole [place] is a 28 year old female presenting with a chief complaint of   Chief Complaint   Patient presents with     Urgent Care     URI     Myalgia, Sore throat, swollen glands, Fever of 102, cough. Sx x3 days       She is an established patient of [facility].    URI Adult    Onset of symptoms was 3 day(s) ago.  Course of illness is worsening.    Severity moderate  Current and Associated symptoms: fever, sore throat and body aches  Treatment measures tried include Tylenol/Ibuprofen.  Predisposing factors include None.   Did get influenza vaccine      Review of Systems   Constitutional: Positive for fever.   HENT: Positive for sore throat.    Respiratory: Negative for cough.    Gastrointestinal: Negative for diarrhea, nausea and vomiting.       Past Medical History:   Diagnosis Date     Depression with anxiety      Obesity, morbid (H)      Family History   Problem Relation Age of Onset     Gastrointestinal Disease Mother         born  ulcerative colitis      Hypertension Mother         with kidney disease     Lipids Father      Family History Negative Sister      Hypertension Maternal Grandmother      Family History Negative Maternal Grandfather      Thyroid Disease Paternal Grandmother          80yo pulmonary fibrosis (smoker)     Heart Disease Paternal Grandfather          54yo MI after MVA     Current Outpatient Medications   Medication Sig Dispense Refill     amoxicillin (AMOXIL) 875 MG tablet Take 1 tablet (875 mg) by mouth 2 times daily for 10 days 20 tablet 0     buPROPion (WELLBUTRIN XL) 300 MG 24 hr tablet Take 1 tablet (300 mg) by mouth every morning 90 tablet 3     CALCIUM-VITAMIN D PO Take 1 tablet by mouth 2 times daily. 200 mg calcium, 400 IU vitamin D       Cholecalciferol (VITAMIN D PO) Take 2,000 Units by mouth daily.       Cyanocobalamin (B-12) 1000 MCG CAPS Take 1 capsule by mouth daily       HARIS 3-0.03 MG per tablet Take 1 tablet by mouth daily 84 tablet 3      LORazepam (ATIVAN) 1 MG tablet Take 1 tablet (1 mg) by mouth every 8 hours as needed for anxiety (Patient not taking: Reported on 2/23/2019) 20 tablet 0     Social History     Tobacco Use     Smoking status: Never Smoker     Smokeless tobacco: Never Used   Substance Use Topics     Alcohol use: Yes     Comment: rare       OBJECTIVE  /80 (BP Location: Right arm, Patient Position: Chair, Cuff Size: Adult Large)   Pulse 112   Temp 98.8  F (37.1  C) (Oral)   SpO2 97%     Physical Exam   Constitutional: She appears well-developed and well-nourished. No distress.   HENT:   Head: Normocephalic and atraumatic.   Right Ear: Tympanic membrane normal.   Left Ear: Tympanic membrane normal.   Mouth/Throat: Posterior oropharyngeal erythema present.   Eyes: Conjunctivae are normal.   Neck: Normal range of motion.   Cardiovascular: Regular rhythm and normal heart sounds.   Pulmonary/Chest: Effort normal and breath sounds normal. No respiratory distress. She has no wheezes. She has no rales.   Neurological: She is alert.   Skin: Skin is warm and dry.   Psychiatric: She has a normal mood and affect.       Labs:  Results for orders placed or performed in visit on 02/23/19 (from the past 24 hour(s))   Influenza A/B antigen   Result Value Ref Range    Influenza A/B Agn Specimen Nasal     Influenza A Negative NEG^Negative    Influenza B Negative NEG^Negative   Rapid strep screen   Result Value Ref Range    Specimen Description Throat     Rapid Strep A Screen (A)      POSITIVE: Group A Streptococcal antigen detected by immunoassay.           ASSESSMENT:      ICD-10-CM    1. Strep throat J02.0 amoxicillin (AMOXIL) 875 MG tablet   2. Throat pain R07.0 Influenza A/B antigen     Rapid strep screen   3. Fever in adult R50.9 Influenza A/B antigen     Rapid strep screen          PLAN:    Strep throat: Amoxicillin Rx. Tylenol or motrin prn fever. Discard old toothbrush. Follow up if any worsening symptoms. Patient agrees.        Followup:    If not improving or if condition worsens, follow up with your Primary Care Provider

## 2019-02-24 ENCOUNTER — MYC MEDICAL ADVICE (OUTPATIENT)
Dept: FAMILY MEDICINE | Facility: CLINIC | Age: 29
End: 2019-02-24

## 2019-02-25 NOTE — TELEPHONE ENCOUNTER
Marquita please see note from Nicole and advise on your recommendation. Thank you, Marci Liao R.N.

## 2019-04-19 DIAGNOSIS — Z30.41 ENCOUNTER FOR SURVEILLANCE OF CONTRACEPTIVE PILLS: ICD-10-CM

## 2019-04-19 DIAGNOSIS — N92.6 IRREGULAR PERIODS: ICD-10-CM

## 2019-04-22 RX ORDER — DROSPIRENONE AND ETHINYL ESTRADIOL 0.03MG-3MG
KIT ORAL
Qty: 84 TABLET | Refills: 3 | OUTPATIENT
Start: 2019-04-22

## 2019-04-22 NOTE — TELEPHONE ENCOUNTER
Refills can be addressed at office visit on 4/26/19. Of note, patient should have enough medication through end of May.  BOONE Uribe, RN  [facility]

## 2019-04-22 NOTE — TELEPHONE ENCOUNTER
"Requested Prescriptions   Pending Prescriptions Disp Refills     HARIS 3-0.03 MG tablet [Pharmacy Med Name: HARIS 3-0.03MG TABS]  A little early  Last Written Prescription Date:  6/4/2018  Last Fill Quantity: 84 tablet,  # refills: 3   Last office visit: 5/30/2018 with prescribing provider:  Leslie     Future Office Visit:   Next 5 appointments (look out 90 days)    Apr 26, 2019  8:00 AM CDT  MyChart Physical Adult with Marquita Nelson PA-C  [facility] [address] [facility] [address]  [phone]            84 tablet 3     Sig: TAKE ONE TABLET BY MOUTH EVERY DAY (DAW1)       Contraceptives Protocol Passed - 4/19/2019 10:58 PM        Passed - Patient is not a current smoker if age is 35 or older        Passed - Recent (12 mo) or future (30 days) visit within the authorizing provider's specialty     Patient had office visit in the last 12 months or has a visit in the next 30 days with authorizing provider or within the authorizing provider's specialty.  See \"Patient Info\" tab in inbasket, or \"Choose Columns\" in Meds & Orders section of the refill encounter.              Passed - Medication is active on med list        Passed - No active pregnancy on record        Passed - No positive pregnancy test in past 12 months        "

## 2019-04-26 ENCOUNTER — OFFICE VISIT (OUTPATIENT)
Dept: FAMILY MEDICINE | Facility: CLINIC | Age: 29
End: 2019-04-26
Payer: COMMERCIAL

## 2019-04-26 VITALS
HEART RATE: 108 BPM | TEMPERATURE: 99 F | WEIGHT: 271 LBS | SYSTOLIC BLOOD PRESSURE: 124 MMHG | BODY MASS INDEX: 42.53 KG/M2 | OXYGEN SATURATION: 99 % | DIASTOLIC BLOOD PRESSURE: 80 MMHG | HEIGHT: 67 IN

## 2019-04-26 DIAGNOSIS — Z12.4 CERVICAL CANCER SCREENING: ICD-10-CM

## 2019-04-26 DIAGNOSIS — E78.1 HYPERTRIGLYCERIDEMIA: ICD-10-CM

## 2019-04-26 DIAGNOSIS — Z13.21 ENCOUNTER FOR VITAMIN DEFICIENCY SCREENING: ICD-10-CM

## 2019-04-26 DIAGNOSIS — Z30.41 ENCOUNTER FOR SURVEILLANCE OF CONTRACEPTIVE PILLS: ICD-10-CM

## 2019-04-26 DIAGNOSIS — F41.1 GAD (GENERALIZED ANXIETY DISORDER): ICD-10-CM

## 2019-04-26 DIAGNOSIS — N92.6 IRREGULAR PERIODS: ICD-10-CM

## 2019-04-26 DIAGNOSIS — E66.01 OBESITY, MORBID (H): ICD-10-CM

## 2019-04-26 DIAGNOSIS — Z11.4 ENCOUNTER FOR SCREENING FOR HIV: ICD-10-CM

## 2019-04-26 DIAGNOSIS — F32.5 MAJOR DEPRESSION IN COMPLETE REMISSION (H): ICD-10-CM

## 2019-04-26 DIAGNOSIS — Z00.01 ENCOUNTER FOR ROUTINE ADULT MEDICAL EXAM WITH ABNORMAL FINDINGS: Primary | ICD-10-CM

## 2019-04-26 LAB
ALBUMIN SERPL-MCNC: 3.3 G/DL (ref 3.4–5)
ALP SERPL-CCNC: 67 U/L (ref 40–150)
ALT SERPL W P-5'-P-CCNC: 15 U/L (ref 0–50)
ANION GAP SERPL CALCULATED.3IONS-SCNC: 10 MMOL/L (ref 3–14)
AST SERPL W P-5'-P-CCNC: 14 U/L (ref 0–45)
BILIRUB SERPL-MCNC: 0.3 MG/DL (ref 0.2–1.3)
BUN SERPL-MCNC: 8 MG/DL (ref 7–30)
CALCIUM SERPL-MCNC: 9.1 MG/DL (ref 8.5–10.1)
CHLORIDE SERPL-SCNC: 105 MMOL/L (ref 94–109)
CHOLEST SERPL-MCNC: 132 MG/DL
CO2 SERPL-SCNC: 22 MMOL/L (ref 20–32)
CREAT SERPL-MCNC: 0.7 MG/DL (ref 0.52–1.04)
GFR SERPL CREATININE-BSD FRML MDRD: >90 ML/MIN/{1.73_M2}
GLUCOSE SERPL-MCNC: 90 MG/DL (ref 70–99)
HDLC SERPL-MCNC: 40 MG/DL
HIV 1+2 AB+HIV1 P24 AG SERPL QL IA: NONREACTIVE
LDLC SERPL CALC-MCNC: 45 MG/DL
NONHDLC SERPL-MCNC: 92 MG/DL
POTASSIUM SERPL-SCNC: 4 MMOL/L (ref 3.4–5.3)
PROT SERPL-MCNC: 7.6 G/DL (ref 6.8–8.8)
SODIUM SERPL-SCNC: 137 MMOL/L (ref 133–144)
TRIGL SERPL-MCNC: 237 MG/DL
VIT B12 SERPL-MCNC: 934 PG/ML (ref 193–986)

## 2019-04-26 PROCEDURE — 80061 LIPID PANEL: CPT | Performed by: PHYSICIAN ASSISTANT

## 2019-04-26 PROCEDURE — 82607 VITAMIN B-12: CPT | Performed by: PHYSICIAN ASSISTANT

## 2019-04-26 PROCEDURE — 99395 PREV VISIT EST AGE 18-39: CPT | Performed by: PHYSICIAN ASSISTANT

## 2019-04-26 PROCEDURE — G0145 SCR C/V CYTO,THINLAYER,RESCR: HCPCS | Performed by: PHYSICIAN ASSISTANT

## 2019-04-26 PROCEDURE — 36415 COLL VENOUS BLD VENIPUNCTURE: CPT | Performed by: PHYSICIAN ASSISTANT

## 2019-04-26 PROCEDURE — 87389 HIV-1 AG W/HIV-1&-2 AB AG IA: CPT | Performed by: PHYSICIAN ASSISTANT

## 2019-04-26 PROCEDURE — 80053 COMPREHEN METABOLIC PANEL: CPT | Performed by: PHYSICIAN ASSISTANT

## 2019-04-26 RX ORDER — BUPROPION HYDROCHLORIDE 300 MG/1
300 TABLET ORAL EVERY MORNING
Qty: 90 TABLET | Refills: 1 | Status: SHIPPED | OUTPATIENT
Start: 2019-04-26 | End: 2019-10-25

## 2019-04-26 RX ORDER — DROSPIRENONE AND ETHINYL ESTRADIOL 0.03MG-3MG
1 KIT ORAL DAILY
Qty: 84 TABLET | Refills: 3 | Status: SHIPPED | OUTPATIENT
Start: 2019-04-26 | End: 2020-08-19

## 2019-04-26 ASSESSMENT — ANXIETY QUESTIONNAIRES
5. BEING SO RESTLESS THAT IT IS HARD TO SIT STILL: NOT AT ALL
2. NOT BEING ABLE TO STOP OR CONTROL WORRYING: NOT AT ALL
6. BECOMING EASILY ANNOYED OR IRRITABLE: NOT AT ALL
GAD7 TOTAL SCORE: 0
7. FEELING AFRAID AS IF SOMETHING AWFUL MIGHT HAPPEN: NOT AT ALL
1. FEELING NERVOUS, ANXIOUS, OR ON EDGE: NOT AT ALL
3. WORRYING TOO MUCH ABOUT DIFFERENT THINGS: NOT AT ALL

## 2019-04-26 ASSESSMENT — PATIENT HEALTH QUESTIONNAIRE - PHQ9
5. POOR APPETITE OR OVEREATING: NOT AT ALL
SUM OF ALL RESPONSES TO PHQ QUESTIONS 1-9: 2

## 2019-04-26 ASSESSMENT — MIFFLIN-ST. JEOR: SCORE: 1978.94

## 2019-04-26 NOTE — PROGRESS NOTES
"   SUBJECTIVE:   CC: Nicole Sinclair is an 29 year old woman who presents for preventive health visit.     Answers for HPI/ROS submitted by the patient on 4/24/2019   Annual Exam:  Frequency of exercise:: 1 day/week  Getting at least 3 servings of Calcium per day:: Yes  Diet:: Vegetarian/vegan  Taking medications regularly:: Yes  Medication side effects:: None  Bi-annual eye exam:: Yes  Dental care twice a year:: Yes  Sleep apnea or symptoms of sleep apnea:: None  Additional concerns today:: No  Duration of exercise:: 15-30 minutes    Med refills:  Depression and anxiety Follow-up;  Has taken wellbutrin since 2009 - has been on 300mg dose for past few years and done very well with this. Does not feel she requires any medication adjustments at all.   [facility] Feels this was very helpful and good connection with this person. Wishes she would have sought counseling/therapy sooner. [place] she really positively changed after this.      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 Has taken for irregular periods for \"a long time.\" Was tested for PCOS and reports this was negative. Since on OCPs periods are regular and things are going well. Had been switched to a different brand at one point and this impacted her mood. Has done well on jaqui since then. LMP: 4/4/2019  No current sexual partner.   Non-smoker.    Is a vegetarian so doesn't eat any meat. Takes B12 supplement. Can check labs for stability.      Today's PHQ-2 Score:   PHQ-2 ( 1999 Pfizer) 4/24/2019 7/13/2017   Q1: Little interest or pleasure in doing things 0 0   Q2: Feeling down, depressed or hopeless 0 0   PHQ-2 Score 0 0   Q1: Little interest or pleasure in doing things Not at all -   Q2: Feeling down, depressed or hopeless Not at all -   PHQ-2 Score 0 -       Abuse: Current or Past(Physical, Sexual or Emotional)- No  Do you feel safe in your environment? Yes    Social History "     Tobacco Use     Smoking status: Never Smoker     Smokeless tobacco: Never Used   Substance Use Topics     Alcohol use: Yes     Comment: rare     If you drink alcohol do you typically have >3 drinks per day or >7 drinks per week? No                     Reviewed orders with patient.  Reviewed health maintenance and updated orders accordingly - Yes  BP Readings from Last 3 Encounters:   19 124/80   19 112/80   18 110/64    Wt Readings from Last 3 Encounters:   19 122.9 kg (271 lb)   18 122 kg (269 lb)   17 111.6 kg (246 lb)                  Patient Active Problem List   Diagnosis     Mild major depression (H)     CARDIOVASCULAR SCREENING; LDL GOAL LESS THAN 160     Generalized anxiety disorder     Obesity, morbid (H)     Hypertriglyceridemia     Major depression in complete remission (H)     Irregular periods - well controlled on OCPs.     Past Surgical History:   Procedure Laterality Date     NO HISTORY OF SURGERY         Social History     Tobacco Use     Smoking status: Never Smoker     Smokeless tobacco: Never Used   Substance Use Topics     Alcohol use: Yes     Comment: rare     Family History   Problem Relation Age of Onset     Gastrointestinal Disease Mother         born  ulcerative colitis      Hypertension Mother         with kidney disease     Diabetes Mother      Lipids Father      Family History Negative Sister      Hypertension Maternal Grandmother      Family History Negative Maternal Grandfather      Thyroid Disease Paternal Grandmother          80yo pulmonary fibrosis (smoker)     Heart Disease Paternal Grandfather          56yo MI after MVA     Coronary Artery Disease Paternal Grandfather      Breast Cancer No family hx of      Colon Cancer No family hx of          Current Outpatient Medications   Medication Sig Dispense Refill     buPROPion (WELLBUTRIN XL) 300 MG 24 hr tablet Take 1 tablet (300 mg) by mouth every morning 90 tablet 3      CALCIUM-VITAMIN D PO Take 1 tablet by mouth 2 times daily. 200 mg calcium, 400 IU vitamin D       Cholecalciferol (VITAMIN D PO) Take 2,000 Units by mouth daily.       Cyanocobalamin (B-12) 1000 MCG CAPS Take 1 capsule by mouth daily       HARIS 3-0.03 MG per tablet Take 1 tablet by mouth daily 84 tablet 3     LORazepam (ATIVAN) 1 MG tablet Take 1 tablet (1 mg) by mouth every 8 hours as needed for anxiety (Patient not taking: Reported on 2/23/2019) 20 tablet 0     No Known Allergies    Mammogram not appropriate for this patient based on age.    Pertinent mammograms are reviewed under the imaging tab.  History of abnormal Pap smear: NO - age 21-29 PAP every 3 years recommended  PAP / HPV Latest Ref Rng & Units 11/13/2015 11/21/2012   PAP - NIL NIL   HPV 16 DNA NEG Negative -   HPV 18 DNA NEG Negative -   OTHER HR HPV NEG Negative -     Reviewed and updated as needed this visit by clinical staff  Tobacco  Allergies  Meds  Med Hx  Surg Hx  Fam Hx  Soc Hx        Reviewed and updated as needed this visit by Provider  Tobacco  Allergies  Meds  Med Hx  Surg Hx  Fam Hx  Soc Hx           ROS:  CONSTITUTIONAL: NEGATIVE for fever, chills, change in weight  INTEGUMENTARU/SKIN: NEGATIVE for worrisome rashes, moles or lesions  EYES: NEGATIVE for vision changes or irritation  ENT: NEGATIVE for ear, mouth and throat problems  RESP: NEGATIVE for significant cough or SOB  BREAST: NEGATIVE for masses, tenderness or discharge  CV: NEGATIVE for chest pain, palpitations or peripheral edema  GI: NEGATIVE for nausea, abdominal pain, heartburn, or change in bowel habits  : NEGATIVE for unusual urinary or vaginal symptoms. Periods are regular.  MUSCULOSKELETAL: NEGATIVE for significant arthralgias or myalgia  NEURO: NEGATIVE for weakness, dizziness or paresthesias  PSYCHIATRIC: NEGATIVE for changes in mood or affect    OBJECTIVE:   /80 (BP Location: Right arm, Cuff Size: Adult Large)   Pulse 108   Temp 99  F (37.2  C)  "(Oral)   Ht 1.689 m (5' 6.5\")   Wt 122.9 kg (271 lb)   SpO2 99%   BMI 43.09 kg/m    EXAM:  GENERAL: healthy, alert and no distress  EYES: Eyes grossly normal to inspection, PERRL and conjunctivae and sclerae normal  HENT: ear canals and TM's normal, nose and mouth without ulcers or lesions  NECK: no adenopathy, no asymmetry, masses, or scars and thyroid normal to palpation  RESP: lungs clear to auscultation - no rales, rhonchi or wheezes  BREAST: normal without masses, tenderness or nipple discharge and no palpable axillary masses or adenopathy  CV: regular rate and rhythm, normal S1 S2, no S3 or S4, no murmur, click or rub, no peripheral edema and peripheral pulses strong  ABDOMEN: soft, nontender, no hepatosplenomegaly, no masses and bowel sounds normal   (female): normal female external genitalia, normal urethral meatus, vaginal mucosa pink, moist, well rugated, and normal cervix/adnexa/uterus without masses or discharge  MS: no gross musculoskeletal defects noted, no edema  SKIN: no suspicious lesions or rashes  NEURO: Normal strength and tone, mentation intact and speech normal  PSYCH: mentation appears normal, affect normal/bright    Diagnostic Test Results:  See flowsheets for PHQ/FRANK scores    ASSESSMENT/PLAN:       ICD-10-CM    1. Encounter for routine adult medical exam with abnormal findings Z00.01    2. FRANK (generalized anxiety disorder) F41.1 buPROPion (WELLBUTRIN XL) 300 MG 24 hr tablet   3. Major depression in complete remission (H) F32.5 buPROPion (WELLBUTRIN XL) 300 MG 24 hr tablet   4. Encounter for surveillance of contraceptive pills Z30.41 HARIS 3-0.03 MG tablet   5. Irregular periods - well controlled on OCPs. N92.6 HARIS 3-0.03 MG tablet   6. Hypertriglyceridemia E78.1 Lipid panel reflex to direct LDL Fasting     Comprehensive metabolic panel   7. Encounter for vitamin deficiency screening Z13.21 Vitamin B12   8. Encounter for screening for HIV Z11.4 HIV Antigen Antibody Combo   9. " "Cervical cancer screening Z12.4 Pap imaged thin layer screen reflex to HPV if ASCUS - recommend age 25 - 29   10. Obesity, morbid (H) E66.01    Stable on OCPs - refilled for 1 yr. No current sexual activity. Declines GC screening, but encouraged to follow-up anytime with concerns or changes.  Very stable on wellbutrin as well - advised to recheck by phone in 6 months.  Repeat labs for stability and also check B12 level given vegetarian and is on supplementation.       COUNSELING:   Reviewed preventive health counseling, as reflected in patient instructions       Regular exercise       Healthy diet/nutrition       Contraception       Safe sex practices/STD prevention       HIV screeninx in teen years, 1x in adult years, and at intervals if high risk    BP Readings from Last 1 Encounters:   19 124/80     Estimated body mass index is 43.09 kg/m  as calculated from the following:    Height as of this encounter: 1.689 m (5' 6.5\").    Weight as of this encounter: 122.9 kg (271 lb).    BP Screening:   Last 3 BP Readings:    BP Readings from Last 3 Encounters:   19 124/80   19 112/80   18 110/64       The following was recommended to the patient:  Re-screen BP within a year and recommended lifestyle modifications  Weight management plan: Discussed healthy diet and exercise guidelines     reports that she has never smoked. She has never used smokeless tobacco.      Counseling Resources:  ATP IV Guidelines  Pooled Cohorts Equation Calculator  Breast Cancer Risk Calculator  FRAX Risk Assessment  ICSI Preventive Guidelines  Dietary Guidelines for Americans,   USDA's MyPlate  ASA Prophylaxis  Lung CA Screening    Marquita Nelson PA-C  [facility]  "

## 2019-04-27 ASSESSMENT — ANXIETY QUESTIONNAIRES: GAD7 TOTAL SCORE: 0

## 2019-04-30 LAB
COPATH REPORT: NORMAL
PAP: NORMAL

## 2019-05-06 ENCOUNTER — MYC MEDICAL ADVICE (OUTPATIENT)
Dept: FAMILY MEDICINE | Facility: CLINIC | Age: 29
End: 2019-05-06

## 2019-05-06 NOTE — RESULT ENCOUNTER NOTE
Dear Nicole,      Your recent test results are noted below:    -HDL(good) cholesterol level is low and your triglycerides are elevated which can increase your heart disease risk. Triglycerides overall appear a bit better from 2-3 years ago. A diet high in fat and simple carbohydrates, genetics and being overweight can contribute to this. LDL(bad) cholesterol level is normal.  ADVISE: exercising 150 minutes of aerobic exercise per week (30 minutes 5 days per week or 50 minutes 3 days per week are options), and omega-3 fatty acids (fish oil) 5394-0637 mg daily are helpful to improve this. We should check this annually.  -Liver and gallbladder tests are normal (ALT,AST, Alk phos, bilirubin), kidney function is normal (Cr, GFR), sodium is normal, potassium is normal, calcium is normal, glucose is normal.  -HIV test is normal.  -Vitamin B12 level was normal.     For additional lab test information, labtestsonline.org is an excellent reference. Please contact the clinic at [phone] with any further questions or concerns.    Sincerely,      Marquita Nelson PA-C  [facility]

## 2019-10-25 ENCOUNTER — VIRTUAL VISIT (OUTPATIENT)
Dept: FAMILY MEDICINE | Facility: CLINIC | Age: 29
End: 2019-10-25
Payer: COMMERCIAL

## 2019-10-25 DIAGNOSIS — F41.1 GAD (GENERALIZED ANXIETY DISORDER): ICD-10-CM

## 2019-10-25 DIAGNOSIS — F32.5 MAJOR DEPRESSION IN COMPLETE REMISSION (H): ICD-10-CM

## 2019-10-25 PROCEDURE — 99441 ZZC PHYSICIAN TELEPHONE EVALUATION 5-10 MIN: CPT | Performed by: PHYSICIAN ASSISTANT

## 2019-10-25 RX ORDER — BUPROPION HYDROCHLORIDE 300 MG/1
300 TABLET ORAL EVERY MORNING
Qty: 90 TABLET | Refills: 1 | Status: SHIPPED | OUTPATIENT
Start: 2019-10-25 | End: 2020-08-19

## 2019-10-25 ASSESSMENT — PATIENT HEALTH QUESTIONNAIRE - PHQ9
SUM OF ALL RESPONSES TO PHQ QUESTIONS 1-9: 4
5. POOR APPETITE OR OVEREATING: NOT AT ALL

## 2019-10-25 ASSESSMENT — ANXIETY QUESTIONNAIRES
6. BECOMING EASILY ANNOYED OR IRRITABLE: SEVERAL DAYS
GAD7 TOTAL SCORE: 2
IF YOU CHECKED OFF ANY PROBLEMS ON THIS QUESTIONNAIRE, HOW DIFFICULT HAVE THESE PROBLEMS MADE IT FOR YOU TO DO YOUR WORK, TAKE CARE OF THINGS AT HOME, OR GET ALONG WITH OTHER PEOPLE: NOT DIFFICULT AT ALL
5. BEING SO RESTLESS THAT IT IS HARD TO SIT STILL: NOT AT ALL
1. FEELING NERVOUS, ANXIOUS, OR ON EDGE: SEVERAL DAYS
3. WORRYING TOO MUCH ABOUT DIFFERENT THINGS: NOT AT ALL
2. NOT BEING ABLE TO STOP OR CONTROL WORRYING: NOT AT ALL
7. FEELING AFRAID AS IF SOMETHING AWFUL MIGHT HAPPEN: NOT AT ALL

## 2019-10-25 NOTE — PROGRESS NOTES
"Nicole Sinclair is a 29 year old female who is being evaluated via a billable telephone visit.      The patient has been notified of following:     \"This telephone visit will be conducted via a call between you and your physician/provider. We have found that certain health care needs can be provided without the need for a physical exam.  This service lets us provide the care you need with a short phone conversation.  If a prescription is necessary we can send it directly to your pharmacy.  If lab work is needed we can place an order for that and you can then stop by our lab to have the test done at a later time.    If during the course of the call the physician/provider feels a telephone visit is not appropriate, you will not be charged for this service.\"     Consent has been obtained for this service by 1 care team member: yes. See the scanned image in the medical record.    Nicole Sinclair complains of  No chief complaint on file.      I have reviewed and updated the patient's Past Medical History, Social History, Family History and Medication List.    ALLERGIES  Patient has no known allergies.    Marlen Carrillo MA    Depression and Anxiety Follow-Up; has \"normal ups/downs\" that anyone has in life. Continues to see her therapist every 2 weeks. Feels this person can really help be a voice of reason for her and keep her from \"spiraling out of control.\"   Continues to tolerate med well. In past when first started felt it made her a little tremulous, but hasn't had an issue with this in a long time.   Just filled her prescription and believes she had a build-up left over so doesn't need yet, but would like sent to mail order so she can call.  Overall no concerns and is doing well.       How are you doing with your depression since your last visit? Doing well on her wellbutrin -     How are you doing with your anxiety since your last visit?  Going well    Are you having other symptoms that might be associated with " depression or anxiety? No, stable on it    Have you had a significant life event? No     Do you have any concerns with your use of alcohol or other drugs? No    Social History     Tobacco Use     Smoking status: Never Smoker     Smokeless tobacco: Never Used   Substance Use Topics     Alcohol use: Yes     Comment: rare     Drug use: No     PHQ 5/30/2018 4/26/2019 10/25/2019   PHQ-9 Total Score 3 2 4   Q9: Thoughts of better off dead/self-harm past 2 weeks Not at all Not at all Not at all     FRANK-7 SCORE 5/30/2018 4/26/2019 10/25/2019   Total Score - - -   Total Score - - -   Total Score 2 0 2     Suicide Assessment Five-step Evaluation and Treatment (SAFE-T)    Assessment/Plan:    ICD-10-CM    1. FRANK (generalized anxiety disorder) F41.1 buPROPion (WELLBUTRIN XL) 300 MG 24 hr tablet   2. Major depression in complete remission (H) F32.5 buPROPion (WELLBUTRIN XL) 300 MG 24 hr tablet   Stable and continues to do very well with wellbutrin.  Encouraged ongoing visits with her therapist as this continues to be very helpful for her.  Recheck in 6 months when due for physical again.    I have reviewed the note as documented above.  This accurately captures the substance of my conversation with the patient.      Total time of call between patient and provider was 5 minutes     Marquita Nelson PA-C

## 2019-10-26 ASSESSMENT — ANXIETY QUESTIONNAIRES: GAD7 TOTAL SCORE: 2

## 2020-01-05 DIAGNOSIS — F32.5 MAJOR DEPRESSION IN COMPLETE REMISSION (H): ICD-10-CM

## 2020-01-05 DIAGNOSIS — F41.1 GAD (GENERALIZED ANXIETY DISORDER): ICD-10-CM

## 2020-01-06 RX ORDER — BUPROPION HYDROCHLORIDE 300 MG/1
TABLET ORAL
Qty: 90 TABLET | Refills: 1 | OUTPATIENT
Start: 2020-01-06

## 2020-01-06 NOTE — TELEPHONE ENCOUNTER
Patient should have available refills. Will deny with note to pharmacy to dispense available refills.

## 2020-01-06 NOTE — TELEPHONE ENCOUNTER
"Requested Prescriptions   Pending Prescriptions Disp Refills     buPROPion (WELLBUTRIN XL) 300 MG 24 hr tablet [Pharmacy Med Name: BUPROPION HCL ER (XL) 300MG TB24]    Last Written Prescription Date:  10/25/19  Last Fill Quantity: 90 tablet,  # refills: 1   Last office visit: 10/25/2019 with prescribing provider:  Leslie     Future Office Visit:     90 tablet 1     Sig: TAKE 1 TABLET BY MOUTH IN THE MORNING       SSRIs Protocol Passed - 1/5/2020  6:42 PM        Passed - PHQ-9 score less than 5 in past 6 months     Please review last PHQ-9 score.   PHQ-9 SCORE 5/30/2018 4/26/2019 10/25/2019   PHQ-9 Total Score - - -   PHQ-9 Total Score MyChart - - -   PHQ-9 Total Score 3 2 4     FRANK-7 SCORE 5/30/2018 4/26/2019 10/25/2019   Total Score - - -   Total Score - - -   Total Score 2 0 2                 Passed - Medication is Bupropion     If the medication is Bupropion (Wellbutrin), and the patient is taking for smoking cessation; OK to refill.          Passed - Medication is active on med list        Passed - Patient is age 18 or older        Passed - No active pregnancy on record        Passed - No positive pregnancy test in last 12 months        Passed - Recent (6 mo) or future (30 days) visit within the authorizing provider's specialty     Patient had office visit in the last 6 months or has a visit in the next 30 days with authorizing provider or within the authorizing provider's specialty.  See \"Patient Info\" tab in inbasket, or \"Choose Columns\" in Meds & Orders section of the refill encounter.            "

## 2020-02-18 ENCOUNTER — THERAPY VISIT (OUTPATIENT)
Dept: CHIROPRACTIC MEDICINE | Facility: CLINIC | Age: 30
End: 2020-02-18
Payer: COMMERCIAL

## 2020-02-18 DIAGNOSIS — M54.9 MECHANICAL BACK PAIN: ICD-10-CM

## 2020-02-18 DIAGNOSIS — M99.02 SEGMENTAL DYSFUNCTION OF THORACIC REGION: ICD-10-CM

## 2020-02-18 DIAGNOSIS — G44.209 TENSION HEADACHE: ICD-10-CM

## 2020-02-18 DIAGNOSIS — M99.01 SEGMENTAL DYSFUNCTION OF CERVICAL REGION: Primary | ICD-10-CM

## 2020-02-18 PROCEDURE — 98940 CHIROPRACT MANJ 1-2 REGIONS: CPT | Mod: AT | Performed by: CHIROPRACTOR

## 2020-02-18 PROCEDURE — 97035 APP MDLTY 1+ULTRASOUND EA 15: CPT | Performed by: CHIROPRACTOR

## 2020-02-18 PROCEDURE — 97110 THERAPEUTIC EXERCISES: CPT | Performed by: CHIROPRACTOR

## 2020-02-18 PROCEDURE — 99202 OFFICE O/P NEW SF 15 MIN: CPT | Mod: 25 | Performed by: CHIROPRACTOR
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
## 2020-03-10 ENCOUNTER — THERAPY VISIT (OUTPATIENT)
Dept: CHIROPRACTIC MEDICINE | Facility: CLINIC | Age: 30
End: 2020-03-10
Payer: COMMERCIAL

## 2020-03-10 DIAGNOSIS — M99.04 SEGMENTAL DYSFUNCTION OF SACRAL REGION: ICD-10-CM

## 2020-03-10 DIAGNOSIS — M99.01 SEGMENTAL DYSFUNCTION OF CERVICAL REGION: Primary | ICD-10-CM

## 2020-03-10 DIAGNOSIS — G44.209 TENSION HEADACHE: ICD-10-CM

## 2020-03-10 DIAGNOSIS — M99.02 SEGMENTAL DYSFUNCTION OF THORACIC REGION: ICD-10-CM

## 2020-03-10 DIAGNOSIS — M54.9 MECHANICAL BACK PAIN: ICD-10-CM

## 2020-03-10 PROCEDURE — 98941 CHIROPRACT MANJ 3-4 REGIONS: CPT | Mod: AT | Performed by: CHIROPRACTOR

## 2020-03-10 NOTE — PROGRESS NOTES
Visit #:  2 of 8 based on treatment plan 2/18/2020    Subjective:  Nicole Sinclair is a 29 year old female who is seen in f/u up for:        Segmental dysfunction of cervical region  Segmental dysfunction of thoracic region  Tension headache  Mechanical back pain.     Since last visit on 2/18/2020,  Nicole Sinclair reports the following changes: Patient presents and states that she was not sore after her adjustment, and felt really good, looser and relaxed. She has only felt pain the last week or so. She did get a massage and that helped. She does have some intermittent hip pain on the left side. Currently she rates her pain in her upper back 2/10.        Objective:  The following was observed:    P: palpatory tenderness right upper back and left hip    A: static palpation demonstrates intersegmental asymmetry     R: motion palpation notes restricted motion    T: localized muscle spasm at: Traps Bilaterally      Assessment:    Segmental spinal dysfunction/restrictions found at:  C2   C5   T1   T5  T10  PSIS Left    Diagnoses:      1. Segmental dysfunction of cervical region    2. Segmental dysfunction of thoracic region    3. Tension headache    4. Mechanical back pain        Patient's condition:  Patient had restrictions pre-manipulation and Patient had decreased motion prior to manipulation    Treatment effectiveness:  Post manipulation there is better intersegmental movement and Patient claims to feel looser post manipulation      Procedures:  CMT:  87035 Chiropractic manipulative treatment 3-4 regions performed   Cervical: Diversified, C2, C5 , Supine  Thoracic: Diversified, T1, T5, T10, Prone  Pelvis: Drop Table, PSIS Left , Prone    Modalities:  45083: MSTM:  To Gluteal and Traps  for 5 min HYPERVOLT    Therapeutic procedures:  Gave patient Ice instructions post adjustment, and instructions for acute care      Prognosis: Good    ProgrDecrease pain in neck and upper back.  Decrease intensity and frequency of  headaches.  ess towards Goals:       Response to Treatment:   Reduction of symptoms with first treatment, along with massage.       Recommendations:    Instructions:ice 20 minutes every other hour as needed and stretch as instructed at visit    Follow-up:  Return to care in 2 weeks. Patient is trying to make an effort in her health.

## 2020-04-19 ENCOUNTER — E-VISIT (OUTPATIENT)
Dept: FAMILY MEDICINE | Facility: CLINIC | Age: 30
End: 2020-04-19
Payer: COMMERCIAL

## 2020-04-19 DIAGNOSIS — F41.0 PANIC ATTACK: Primary | ICD-10-CM

## 2020-04-19 PROCEDURE — 99421 OL DIG E/M SVC 5-10 MIN: CPT | Performed by: PHYSICIAN ASSISTANT

## 2020-04-19 ASSESSMENT — ANXIETY QUESTIONNAIRES
4. TROUBLE RELAXING: MORE THAN HALF THE DAYS
5. BEING SO RESTLESS THAT IT IS HARD TO SIT STILL: SEVERAL DAYS
6. BECOMING EASILY ANNOYED OR IRRITABLE: SEVERAL DAYS
1. FEELING NERVOUS, ANXIOUS, OR ON EDGE: SEVERAL DAYS
2. NOT BEING ABLE TO STOP OR CONTROL WORRYING: NEARLY EVERY DAY
7. FEELING AFRAID AS IF SOMETHING AWFUL MIGHT HAPPEN: SEVERAL DAYS
GAD7 TOTAL SCORE: 11
7. FEELING AFRAID AS IF SOMETHING AWFUL MIGHT HAPPEN: SEVERAL DAYS
GAD7 TOTAL SCORE: 11
3. WORRYING TOO MUCH ABOUT DIFFERENT THINGS: MORE THAN HALF THE DAYS

## 2020-04-20 RX ORDER — LORAZEPAM 1 MG/1
1 TABLET ORAL EVERY 8 HOURS PRN
Qty: 20 TABLET | Refills: 0 | Status: SHIPPED | OUTPATIENT
Start: 2020-04-20

## 2020-04-20 ASSESSMENT — ANXIETY QUESTIONNAIRES: GAD7 TOTAL SCORE: 11

## 2020-04-20 NOTE — TELEPHONE ENCOUNTER
Provider E-Visit time total (minutes): 10 minutes.    See [employer] messages with patient. She has a history of anxiety that has been well-controlled on Wellbutrin. She works as a nurse in the hospital setting, and has had an increase in anxiety over the past few weeks. She feels that the COVID-19 pandemic has contributed to her anxiety. She has been having periodic panic attacks, and would like a refill on lorazepam. Last refill on this was in Jan 2017 for 20 tablets. Continues to follow with her therapist regularly. Rx faxed to pharmacy.      Melissa Mackay PA-C

## 2020-07-14 ENCOUNTER — THERAPY VISIT (OUTPATIENT)
Dept: CHIROPRACTIC MEDICINE | Facility: CLINIC | Age: 30
End: 2020-07-14
Payer: COMMERCIAL

## 2020-07-14 DIAGNOSIS — M99.02 SEGMENTAL DYSFUNCTION OF THORACIC REGION: ICD-10-CM

## 2020-07-14 DIAGNOSIS — M99.01 SEGMENTAL DYSFUNCTION OF CERVICAL REGION: Primary | ICD-10-CM

## 2020-07-14 DIAGNOSIS — G44.209 TENSION HEADACHE: ICD-10-CM

## 2020-07-14 DIAGNOSIS — M54.9 MECHANICAL BACK PAIN: ICD-10-CM

## 2020-07-14 DIAGNOSIS — M99.04 SEGMENTAL DYSFUNCTION OF SACRAL REGION: ICD-10-CM

## 2020-07-14 PROCEDURE — 98941 CHIROPRACT MANJ 3-4 REGIONS: CPT | Mod: AT | Performed by: CHIROPRACTOR

## 2020-07-14 NOTE — PROGRESS NOTES
Visit #:  2 of 8 based on treatment plan 2/18/2020    Subjective:  Nicole Sinclair is a 29 year old female who is seen in f/u up for:        Segmental dysfunction of cervical region  Segmental dysfunction of thoracic region  Tension headache  Mechanical back pain.     Since last visit on 3/10/2020,  Nicole Sinclair reports the following changes: Patient presents and states that she has been doing okay. She has tension in her upper back and neck, but nothing too bad. She rates her current pain 1-2/10.        Objective:  The following was observed:    P: palpatory tenderness right upper back and left hip    A: static palpation demonstrates intersegmental asymmetry     R: motion palpation notes restricted motion    T: localized muscle spasm at: Traps Bilaterally      Assessment:    Segmental spinal dysfunction/restrictions found at:  C1 RR, LRR  C2 LR, RRR   T1 RR, LRR  T3 LR, RRR  T7 E, FR  T10 E, FR  Left SI posterior    Diagnoses:      1. Segmental dysfunction of cervical region    2. Segmental dysfunction of thoracic region    3. Tension headache    4. Mechanical back pain        Patient's condition:  Patient had restrictions pre-manipulation and Patient had decreased motion prior to manipulation    Treatment effectiveness:  Post manipulation there is better intersegmental movement and Patient claims to feel looser post manipulation      Procedures:  CMT:  54710 Chiropractic manipulative treatment 3-4 regions performed   Cervical: Diversified, C1, C2, Supine  Thoracic: Diversified, T1, T3, T7, T10, Prone  Pelvis: Drop Table, PSIS Left , Prone    Modalities:  63248: MSTM:  To Gluteal and Traps  for 5 min HYPERVOLT    Therapeutic procedures:  Gave patient Ice instructions post adjustment, and instructions for acute care      Prognosis: Good    ProgrDecrease pain in neck and upper back.  Decrease intensity and frequency of headaches.  ess towards Goals:       Response to Treatment:   Reduction of symptoms with first  treatment, along with massage.       Recommendations:    Instructions:ice 20 minutes every other hour as needed and stretch as instructed at visit    Follow-up:  Return to care in 2-4 weeks. Patient is trying to make an effort in her health.

## 2020-08-19 ENCOUNTER — VIRTUAL VISIT (OUTPATIENT)
Dept: FAMILY MEDICINE | Facility: CLINIC | Age: 30
End: 2020-08-19
Payer: COMMERCIAL

## 2020-08-19 DIAGNOSIS — F41.1 GAD (GENERALIZED ANXIETY DISORDER): ICD-10-CM

## 2020-08-19 DIAGNOSIS — F32.5 MAJOR DEPRESSION IN COMPLETE REMISSION (H): Primary | ICD-10-CM

## 2020-08-19 PROCEDURE — 99213 OFFICE O/P EST LOW 20 MIN: CPT | Mod: TEL | Performed by: PHYSICIAN ASSISTANT

## 2020-08-19 RX ORDER — BUPROPION HYDROCHLORIDE 300 MG/1
300 TABLET ORAL EVERY MORNING
Qty: 90 TABLET | Refills: 1 | Status: SHIPPED | OUTPATIENT
Start: 2020-08-19 | End: 2021-02-02

## 2020-08-19 ASSESSMENT — PATIENT HEALTH QUESTIONNAIRE - PHQ9
10. IF YOU CHECKED OFF ANY PROBLEMS, HOW DIFFICULT HAVE THESE PROBLEMS MADE IT FOR YOU TO DO YOUR WORK, TAKE CARE OF THINGS AT HOME, OR GET ALONG WITH OTHER PEOPLE: NOT DIFFICULT AT ALL
SUM OF ALL RESPONSES TO PHQ QUESTIONS 1-9: 4
SUM OF ALL RESPONSES TO PHQ QUESTIONS 1-9: 4

## 2020-08-19 ASSESSMENT — ANXIETY QUESTIONNAIRES
1. FEELING NERVOUS, ANXIOUS, OR ON EDGE: NOT AT ALL
7. FEELING AFRAID AS IF SOMETHING AWFUL MIGHT HAPPEN: SEVERAL DAYS
4. TROUBLE RELAXING: NOT AT ALL
5. BEING SO RESTLESS THAT IT IS HARD TO SIT STILL: NOT AT ALL
GAD7 TOTAL SCORE: 4
2. NOT BEING ABLE TO STOP OR CONTROL WORRYING: NOT AT ALL
GAD7 TOTAL SCORE: 4
GAD7 TOTAL SCORE: 4
7. FEELING AFRAID AS IF SOMETHING AWFUL MIGHT HAPPEN: SEVERAL DAYS
6. BECOMING EASILY ANNOYED OR IRRITABLE: SEVERAL DAYS
3. WORRYING TOO MUCH ABOUT DIFFERENT THINGS: MORE THAN HALF THE DAYS

## 2020-08-19 NOTE — PROGRESS NOTES
"Nicole Sinclair is a 30 year old female who is being evaluated via a billable telephone visit.      The patient has been notified of following:     \"This telephone visit will be conducted via a call between you and your physician/provider. We have found that certain health care needs can be provided without the need for a physical exam.  This service lets us provide the care you need with a short phone conversation.  If a prescription is necessary we can send it directly to your pharmacy.  If lab work is needed we can place an order for that and you can then stop by our lab to have the test done at a later time.    Telephone visits are billed at different rates depending on your insurance coverage. During this emergency period, for some insurers they may be billed the same as an in-person visit.  Please reach out to your insurance provider with any questions.    If during the course of the call the physician/provider feels a telephone visit is not appropriate, you will not be charged for this service.\"    Patient has given verbal consent for Telephone visit?  Yes    What phone number would you like to be contacted at?     How would you like to obtain your AVS? Mitesh Pimentel     Nicole Sinclair is a 30 year old female who presents via phone visit today for the following health issues:    HPI  Depression and Anxiety Follow-Up; doing \"ok\" with added stress from COVID19 pandemic with her job. 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   Has taken wellbutrin 300mg daily for 10 yrs now and works well for her.   Does mention side effects, but feels the benefits far outweigh the risk - mentions dry mouth and sometimes tremor. But has improved with drinking water and tremor only occurs if she's tired.   How are you doing with your depression since your last visit? stable   How are you doing with your anxiety since your last " "visit? Worsened - Just stress related anxiety. Doesn't feel she can't manage it. Just \"more to be aware of\" and trying to avoid letting her anxiety get out of control. Had been worse in April. Moved at that time and things have improved.   Continues to see therapist - Rhiannon through [facility] which continues to be helpful.   Meditating, journaling more.   Using Kody called \"head space\". Feels this is a good resource.   Did complete virtual visit with having lorazepam refilled, but only wound up taking 2 so still has many left and really tries to avoid using.   Are you having other symptoms that might be associated with depression or anxiety? No  Have you had a significant life event? Job stress, recently moved   Do you have any concerns with your use of alcohol or other drugs? No   Answers for HPI/ROS submitted by the patient on 8/19/2020   Chronic problems general questions HPI Form   If you checked off any problems, how difficult have these problems made it for you to do your work, take care of things at home, or get along with other people?: Not difficult at all   PHQ9 TOTAL SCORE: 4   FRANK 7 TOTAL SCORE: 4   How many servings of fruits and vegetables do you eat daily?: 2-3   On average, how many sweetened beverages do you drink each day (Examples: soda, juice, sweet tea, etc. Do NOT count diet or artificially sweetened beverages)?: 1   How many minutes a day do you exercise enough to make your heart beat faster?: 10 to 19   How many days a week do you exercise enough to make your heart beat faster?: 3 or less   How many days per week do you miss taking your medication?: 0   Social History           Tobacco Use     Smoking status: Never Smoker     Smokeless tobacco: Never Used   Substance Use Topics     Alcohol use: Yes     Comment: rare     Drug use: No     PHQ 4/26/2019 10/25/2019 8/19/2020   PHQ-9 Total Score 2 4 4   Q9: Thoughts of better off dead/self-harm past 2 weeks Not at all Not at all Not at all     FRANK-7 " SCORE 10/25/2019 4/19/2020 8/19/2020   Total Score - - -   Total Score - 11 (moderate anxiety) 4 (minimal anxiety)   Total Score 2 11 4     Last PHQ-9 8/19/2020   1. Little interest or pleasure in doing things 0   2. Feeling down, depressed, or hopeless 0   3. Trouble falling or staying asleep, or sleeping too much 1   4. Feeling tired or having little energy 1   5. Poor appetite or overeating 2   6. Feeling bad about yourself 0   7. Trouble concentrating 0   8. Moving slowly or restless 0   Q9: Thoughts of better off dead/self-harm past 2 weeks 0   PHQ-9 Total Score 4   Difficulty at work, home, or with people -     FRANK-7  8/19/2020   1. Feeling nervous, anxious, or on edge 0   2. Not being able to stop or control worrying 0   3. Worrying too much about different things 2   4. Trouble relaxing 0   5. Being so restless that it is hard to sit still 0   6. Becoming easily annoyed or irritable 1   7. Feeling afraid, as if something awful might happen 1   FRANK-7 Total Score 4   If you checked any problems, how difficult have they made it for you to do your work, take care of things at home, or get along with other people? -     Review of Systems   Constitutional, HEENT, cardiovascular, pulmonary, gi and gu systems are negative, except as otherwise noted.       Objective          Vitals:  No vitals were obtained today due to virtual visit.    alert and no distress  PSYCH: Alert and oriented times 3; coherent speech, normal   rate and volume, able to articulate logical thoughts, able   to abstract reason, no tangential thoughts, no hallucinations   or delusions  Her affect is normal and pleasant  RESP: No cough, no audible wheezing, able to talk in full sentences  Remainder of exam unable to be completed due to telephone visits            Assessment & Plan     Major depression in complete remission (H)  FRANK (generalized anxiety disorder)  Video visit attempted, but switched to televisit due to software issues.   Very  stable and doing well. Continue therapy.  - buPROPion (WELLBUTRIN XL) 300 MG 24 hr tablet; Take 1 tablet (300 mg) by mouth every morning       Return in about 6 months (around 2/19/2021) for Preventive Visit, mood recheck.    Marquita Nelson PA-C  [facility]    Phone call duration:  10 minutes                Answers for HPI/ROS submitted by the patient on 8/19/2020   Chronic problems general questions HPI Form  If you checked off any problems, how difficult have these problems made it for you to do your work, take care of things at home, or get along with other people?: Not difficult at all  PHQ9 TOTAL SCORE: 4  FRANK 7 TOTAL SCORE: 4  How many servings of fruits and vegetables do you eat daily?: 2-3  On average, how many sweetened beverages do you drink each day (Examples: soda, juice, sweet tea, etc.  Do NOT count diet or artificially sweetened beverages)?: 1  How many minutes a day do you exercise enough to make your heart beat faster?: 10 to 19  How many days a week do you exercise enough to make your heart beat faster?: 3 or less  How many days per week do you miss taking your medication?: 0

## 2020-08-20 ASSESSMENT — ANXIETY QUESTIONNAIRES: GAD7 TOTAL SCORE: 4

## 2020-08-25 ENCOUNTER — THERAPY VISIT (OUTPATIENT)
Dept: CHIROPRACTIC MEDICINE | Facility: CLINIC | Age: 30
End: 2020-08-25
Payer: COMMERCIAL

## 2020-08-25 DIAGNOSIS — G44.209 TENSION HEADACHE: ICD-10-CM

## 2020-08-25 DIAGNOSIS — M99.02 SEGMENTAL DYSFUNCTION OF THORACIC REGION: ICD-10-CM

## 2020-08-25 DIAGNOSIS — M54.9 MECHANICAL BACK PAIN: ICD-10-CM

## 2020-08-25 DIAGNOSIS — M99.01 SEGMENTAL DYSFUNCTION OF CERVICAL REGION: Primary | ICD-10-CM

## 2020-08-25 DIAGNOSIS — M99.04 SEGMENTAL DYSFUNCTION OF SACRAL REGION: ICD-10-CM

## 2020-08-25 PROCEDURE — 98941 CHIROPRACT MANJ 3-4 REGIONS: CPT | Mod: AT | Performed by: CHIROPRACTOR

## 2020-08-25 NOTE — PROGRESS NOTES
Visit #:  3 of 8 based on treatment plan 2/18/2020    Subjective:  Nicole Sinclair is a 29 year old female who is seen in f/u up for:        Segmental dysfunction of cervical region  Segmental dysfunction of thoracic region  Tension headache  Mechanical back pain.     Since last visit on 7/14/2020,  Nicole Sinclair reports the following changes: Patient presents and states that she had some more neck stress and pain into the shoulders recently. She hasn't been sleeping well as a result. She rates her current pain 2-3/10. She has not had any headaches.        Objective:  The following was observed:    P: palpatory tenderness right upper back and left hip    A: static palpation demonstrates intersegmental asymmetry     R: motion palpation notes restricted motion    T: localized muscle spasm at: Traps Bilaterally      Assessment:    Segmental spinal dysfunction/restrictions found at:  C1 RR, LRR  C2 LR, RRR   T1 RR, LRR  T3 LR, RRR  T7 E, FR  T10 E, FR  Left SI posterior    Diagnoses:      1. Segmental dysfunction of cervical region    2. Segmental dysfunction of thoracic region    3. Tension headache    4. Mechanical back pain        Patient's condition:  Patient had restrictions pre-manipulation and Patient had decreased motion prior to manipulation    Treatment effectiveness:  Post manipulation there is better intersegmental movement and Patient claims to feel looser post manipulation      Procedures:  CMT:  82984 Chiropractic manipulative treatment 3-4 regions performed   Cervical: Diversified, C1, C2, Supine  Thoracic: Diversified, T1, T3, T7, T10, Prone  Pelvis: Drop Table, PSIS Left , Prone    Modalities:  48549: MSTM:  To Gluteal and Traps  for 5 min HYPERVOLT    Therapeutic procedures:  Gave patient Ice instructions post adjustment, and instructions for acute care      Prognosis: Good    Progress towards goals:  Decrease pain in neck and upper back.  Decrease intensity and frequency of headaches.  ess towards  "Goals:       Response to Treatment:   Reduction of symptoms with care, patient states that she feels \"lighter.\"      Recommendations:    Instructions:ice 20 minutes every other hour as needed and stretch as instructed at visit    Follow-up:  Return to care in 2-4 weeks. Patient is trying to make an effort in her health.       "

## 2020-10-06 ENCOUNTER — THERAPY VISIT (OUTPATIENT)
Dept: CHIROPRACTIC MEDICINE | Facility: CLINIC | Age: 30
End: 2020-10-06
Payer: COMMERCIAL

## 2020-10-06 DIAGNOSIS — M54.9 MECHANICAL BACK PAIN: ICD-10-CM

## 2020-10-06 DIAGNOSIS — M99.04 SEGMENTAL DYSFUNCTION OF SACRAL REGION: ICD-10-CM

## 2020-10-06 DIAGNOSIS — G44.209 TENSION HEADACHE: ICD-10-CM

## 2020-10-06 DIAGNOSIS — M99.01 SEGMENTAL DYSFUNCTION OF CERVICAL REGION: Primary | ICD-10-CM

## 2020-10-06 DIAGNOSIS — M99.02 SEGMENTAL DYSFUNCTION OF THORACIC REGION: ICD-10-CM

## 2020-10-06 PROCEDURE — 98941 CHIROPRACT MANJ 3-4 REGIONS: CPT | Mod: AT | Performed by: CHIROPRACTOR

## 2020-10-06 NOTE — PROGRESS NOTES
"Visit #:  4 of 8 based on treatment plan 2/18/2020    Subjective:  Nicole Sinclair is a 29 year old female who is seen in f/u up for:     Data Unavailable.     Since last visit on 8/25/2020,  Nicole Sinclair reports the following changes: Patient presents and states that she has had a lot of neck pain recently, but just on her right side. It goes down into her shoulder. She denies radiation of symptoms. She has not had any headaches. She rates her current pain 4/10.        Objective:  The following was observed:    P: palpatory tenderness right upper back     A: static palpation demonstrates intersegmental asymmetry     R: motion palpation notes restricted motion    T: localized muscle spasm at: Traps Bilaterally      Assessment:    Segmental spinal dysfunction/restrictions found at:  C1 RR, LRR  C2 LR, RRR   T1 RR, LRR  T3 LR, RRR  T7 E, FR  T10 E, FR  Left SI posterior    Diagnoses:      No diagnosis found.    Patient's condition:  Patient had restrictions pre-manipulation and Patient had decreased motion prior to manipulation    Treatment effectiveness:  Post manipulation there is better intersegmental movement and Patient claims to feel looser post manipulation      Procedures:  CMT:  29852 Chiropractic manipulative treatment 3-4 regions performed   Cervical: Diversified, C1, C2, Supine  Thoracic: Diversified, T1, T3, T7, T10, Prone  Pelvis: Drop Table, PSIS Left , Prone    Modalities:  84603: MSTM:  To Gluteal and Traps  for 5 min HYPERVOLT    Therapeutic procedures:  Gave patient Ice instructions post adjustment, and instructions for acute care      Prognosis: Good    Progress towards goals:  Decrease pain in neck and upper back.  Decrease intensity and frequency of headaches.  ess towards Goals:       Response to Treatment:   Reduction of symptoms with care, patient states that she feels \"lighter.\"      Recommendations:    Instructions:ice 20 minutes every other hour as needed and stretch as instructed at " visit    Follow-up:  Return to care in 2-4 weeks. Patient is trying to make an effort in her health.

## 2020-11-03 ENCOUNTER — THERAPY VISIT (OUTPATIENT)
Dept: CHIROPRACTIC MEDICINE | Facility: CLINIC | Age: 30
End: 2020-11-03
Payer: COMMERCIAL

## 2020-11-03 DIAGNOSIS — M99.02 SEGMENTAL DYSFUNCTION OF THORACIC REGION: ICD-10-CM

## 2020-11-03 DIAGNOSIS — M99.01 SEGMENTAL DYSFUNCTION OF CERVICAL REGION: Primary | ICD-10-CM

## 2020-11-03 DIAGNOSIS — G44.209 TENSION HEADACHE: ICD-10-CM

## 2020-11-03 DIAGNOSIS — M54.9 MECHANICAL BACK PAIN: ICD-10-CM

## 2020-11-03 DIAGNOSIS — M99.04 SEGMENTAL DYSFUNCTION OF SACRAL REGION: ICD-10-CM

## 2020-11-03 PROCEDURE — 98941 CHIROPRACT MANJ 3-4 REGIONS: CPT | Mod: AT | Performed by: CHIROPRACTOR

## 2020-11-03 NOTE — PROGRESS NOTES
Visit #:  5 of 8 based on treatment plan 2/18/2020    Subjective:  Nicole Sinclair is a 29 year old female who is seen in f/u up for:        Segmental dysfunction of cervical region  Segmental dysfunction of thoracic region  Tension headache  Mechanical back pain.     Since last visit on 10/6/2020,  Nicole Sinclair reports the following changes: Patient presents and states that she has been doing good. She had a code a couple weeks ago where they had to lift someone from the chair to bed and she had pain in her left back and neck. She has felt herself leaning to the left as a result. She has tightness in her neck currently, but no pain.        Objective:  The following was observed:    P: palpatory tenderness left upper back     A: static palpation demonstrates intersegmental asymmetry     R: motion palpation notes restricted motion    T: localized muscle spasm at: Traps Bilaterally      Assessment:    Segmental spinal dysfunction/restrictions found at:  C2 RR, LRR  C5 LR, RRR   T1 RR, LRR  T3 LR, RRR  T7 E, FR  T10 E, FR  Left SI posterior    Diagnoses:      1. Segmental dysfunction of cervical region    2. Segmental dysfunction of thoracic region    3. Tension headache    4. Mechanical back pain        Patient's condition:  Patient had restrictions pre-manipulation and Patient had decreased motion prior to manipulation    Treatment effectiveness:  Post manipulation there is better intersegmental movement and Patient claims to feel looser post manipulation      Procedures:  CMT:  47439 Chiropractic manipulative treatment 3-4 regions performed   Cervical: Diversified, C2, C5, Supine  Thoracic: Diversified, T1, T3, T7, T10, Prone  Pelvis: Drop Table, PSIS Left , Prone    Modalities:  25702: MSTM:  To Gluteal and Traps  for 5 min HYPERVOLT    Therapeutic procedures:  Gave patient Ice instructions post adjustment, and instructions for acute care      Prognosis: Good    Progress towards goals:  Decrease pain in neck and  "upper back.  Decrease intensity and frequency of headaches.  ess towards Goals:       Response to Treatment:   Reduction of symptoms with care, patient states that she feels \"lighter.\"      Recommendations:    Instructions:ice 20 minutes every other hour as needed and stretch as instructed at visit    Follow-up:  Return to care in 2-4 weeks. Patient is trying to make an effort in her health.     "

## 2020-11-16 ENCOUNTER — HEALTH MAINTENANCE LETTER (OUTPATIENT)
Age: 30
End: 2020-11-16

## 2020-11-24 ENCOUNTER — THERAPY VISIT (OUTPATIENT)
Dept: CHIROPRACTIC MEDICINE | Facility: CLINIC | Age: 30
End: 2020-11-24
Payer: COMMERCIAL

## 2020-11-24 DIAGNOSIS — G44.209 TENSION HEADACHE: ICD-10-CM

## 2020-11-24 DIAGNOSIS — M99.01 SEGMENTAL DYSFUNCTION OF CERVICAL REGION: Primary | ICD-10-CM

## 2020-11-24 DIAGNOSIS — M99.02 SEGMENTAL DYSFUNCTION OF THORACIC REGION: ICD-10-CM

## 2020-11-24 DIAGNOSIS — M99.04 SEGMENTAL DYSFUNCTION OF SACRAL REGION: ICD-10-CM

## 2020-11-24 DIAGNOSIS — M54.9 MECHANICAL BACK PAIN: ICD-10-CM

## 2020-11-24 PROCEDURE — 98941 CHIROPRACT MANJ 3-4 REGIONS: CPT | Mod: AT | Performed by: CHIROPRACTOR

## 2020-11-24 NOTE — PROGRESS NOTES
Visit #:  6 of 8 based on treatment plan 2/18/2020    Subjective:  Nicole Sinclair is a 29 year old female who is seen in f/u up for:        Segmental dysfunction of cervical region  Segmental dysfunction of thoracic region  Tension headache  Mechanical back pain.     Since last visit on 11/3/2020,  Nicole Sinclair reports the following changes: Patient presents and states that she is doing good, since last time her neck has been good. Her hips have been bothering her on the left, she isn't sure what she did, but it has been bothering her. She rates her current pain 2/10. 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 SHe attributes a lot of this to her pain.        Objective:  The following was observed:    P: palpatory tenderness left upper back, left lower back    A: static palpation demonstrates intersegmental asymmetry     R: motion palpation notes restricted motion    T: localized muscle spasm at: Traps Bilaterally      Assessment:    Segmental spinal dysfunction/restrictions found at:  C1 RR, LRR  C2 LR, RRR   T1 RR, LRR  T3 LR, RRR  T7 E, FR  T10 E, FR  Left SI posterior    Diagnoses:      1. Segmental dysfunction of cervical region    2. Segmental dysfunction of thoracic region    3. Tension headache    4. Mechanical back pain        Patient's condition:  Patient had restrictions pre-manipulation and Patient had decreased motion prior to manipulation    Treatment effectiveness:  Post manipulation there is better intersegmental movement and Patient claims to feel looser post manipulation      Procedures:  CMT:  06565 Chiropractic manipulative treatment 3-4 regions performed   Cervical: Diversified, C1, C2, Supine  Thoracic: Diversified, T1, T3, T7, T10, Prone  Pelvis: Drop Table, PSIS Left , Prone    Modalities:  95589: MSTM:  To Gluteal and Traps  for 5 min  "HYPERVOLT    Therapeutic procedures:  Gave patient Ice instructions post adjustment, and instructions for acute care      Prognosis: Good    Progress towards goals:  Decrease pain in neck and upper back.  Decrease intensity and frequency of headaches.  ess towards Goals:       Response to Treatment:   Reduction of symptoms with care, patient states that she feels \"lighter.\"      Recommendations:    Instructions:ice 20 minutes every other hour as needed and stretch as instructed at visit    Follow-up:  Return to care in 2-4 weeks. Patient is trying to make an effort in her health.     "

## 2020-12-10 ENCOUNTER — OFFICE VISIT (OUTPATIENT)
Dept: OBGYN | Facility: CLINIC | Age: 30
End: 2020-12-10
Payer: COMMERCIAL

## 2020-12-10 VITALS — WEIGHT: 286 LBS | SYSTOLIC BLOOD PRESSURE: 126 MMHG | DIASTOLIC BLOOD PRESSURE: 86 MMHG | BODY MASS INDEX: 45.47 KG/M2

## 2020-12-10 DIAGNOSIS — Z01.812 PRE-PROCEDURE LAB EXAM: ICD-10-CM

## 2020-12-10 DIAGNOSIS — Z30.430 ENCOUNTER FOR INSERTION OF INTRAUTERINE CONTRACEPTIVE DEVICE: Primary | ICD-10-CM

## 2020-12-10 LAB — HCG UR QL: NEGATIVE

## 2020-12-10 PROCEDURE — 58300 INSERT INTRAUTERINE DEVICE: CPT | Performed by: OBSTETRICS & GYNECOLOGY

## 2020-12-10 PROCEDURE — 81025 URINE PREGNANCY TEST: CPT | Performed by: OBSTETRICS & GYNECOLOGY

## 2020-12-10 RX ORDER — COPPER 313.4 MG/1
1 INTRAUTERINE DEVICE INTRAUTERINE ONCE
Status: DISCONTINUED
Start: 2020-12-10 | End: 2023-11-03 | Stop reason: ALTCHOICE

## 2020-12-10 RX ORDER — COPPER 313.4 MG/1
1 INTRAUTERINE DEVICE INTRAUTERINE ONCE
COMMUNITY
End: 2023-11-03 | Stop reason: ALTCHOICE

## 2020-12-10 NOTE — PROGRESS NOTES
SUBJECTIVE:                                                   Nicole Sinclair is a 30 year old female who presents to clinic today for the following health issue(s):  Patient presents with:  IUD: Discuss Paragard IUD      HPI:  Nicole is here seeking an IUD. She noted a great improvement in her mood since stopping to take OCPs. She would like to avoid taking hormones. She has regular menses that alternate between 28 and 35 days that last about 3 days. She would like a more reliable form of contraception.     Patient's last menstrual period was 2020..     Patient is sexually active, .  Using condoms, natural family planning and pull out method for contraception.    reports that she has never smoked. She has never used smokeless tobacco.    STD testing offered?  Declined    Health maintenance updated:  yes    Problem list and histories reviewed & adjusted, as indicated.  Additional history: as documented.    Patient Active Problem List   Diagnosis     CARDIOVASCULAR SCREENING; LDL GOAL LESS THAN 160     Generalized anxiety disorder     Obesity, morbid (H)     Hypertriglyceridemia     Major depression in complete remission (H)     Irregular periods - well controlled on OCPs.     Segmental dysfunction of cervical region     Segmental dysfunction of thoracic region     Tension headache     Mechanical back pain     Past Surgical History:   Procedure Laterality Date     wisdom teeth  2008      Social History     Tobacco Use     Smoking status: Never Smoker     Smokeless tobacco: Never Used   Substance Use Topics     Alcohol use: Yes     Comment: Special occasions only      Problem (# of Occurrences) Relation (Name,Age of Onset)    Coronary Artery Disease (1) Paternal Grandfather    Depression (2) Mother (Mother), Maternal Grandmother (Grandmother)    Diabetes (1) Mother (Mother)    Family History Negative (2) Sister, Maternal Grandfather    Gastrointestinal Disease (1) Mother (Mother): born [birth year] ulcerative  colitis     Genitourinary Problems (1) Mother (Mother): kidney transplant -?BP versus vioxx    Heart Disease (1) Paternal Grandfather:  54yo MI after MVA    Hyperlipidemia (1) Father (Father)    Hypertension (2) Mother (Mother): with kidney disease, Maternal Grandmother (Grandmother)    Lipids (1) Father (Father)    No Known Problems (2) Brother, Other    Obesity (1) Mother (Mother)    Thyroid Disease (1) Paternal Grandmother (Grandmother):  78yo pulmonary fibrosis (smoker)       Negative family history of: Breast Cancer, Colon Cancer            Current Outpatient Medications   Medication Sig     buPROPion (WELLBUTRIN XL) 300 MG 24 hr tablet Take 1 tablet (300 mg) by mouth every morning     Cholecalciferol (VITAMIN D PO) Take 2,000 Units by mouth daily.     LORazepam (ATIVAN) 1 MG tablet Take 1 tablet (1 mg) by mouth every 8 hours as needed for anxiety     paragard intrauterine copper device 1 each by Intrauterine route once     Current Facility-Administered Medications   Medication     paragard intrauterine copper IUD device 1 each     No Known Allergies    ROS:  12 point review of systems negative other than symptoms noted below or in the HPI.  No urinary frequency or dysuria, bladder or kidney problems      OBJECTIVE:     /86   Wt 129.7 kg (286 lb)   LMP 2020   Breastfeeding No   BMI 45.47 kg/m    Body mass index is 45.47 kg/m .    Exam:  Constitutional:  Appearance: Well nourished, well developed alert, in no acute distress  Gastrointestinal:  Abdominal Examination:  Abdomen nontender to palpation, tone normal without rigidity or guarding, no masses present, umbilicus without lesions; Liver/Spleen:  No hepatomegaly present, liver nontender to palpation; Hernias:  No hernias present  Skin: General Inspection:  No rashes present, no lesions present, no areas of discoloration.  Neurologic:  Mental Status:  Oriented X3.  Normal strength and tone, sensory exam grossly normal, mentation  intact and speech normal.    Psychiatric:  Mentation appears normal and affect normal/bright.  Pelvic Exam:  External Genitalia:     Normal appearance for age, no discharge present, no tenderness present, no inflammatory lesions present, color normal  Vagina:     Normal vaginal vault without central or paravaginal defects, no discharge present, no inflammatory lesions present, no masses present  Bladder:     Nontender to palpation  Urethra:   Urethral Body:  Urethra palpation normal, urethra structural support normal   Urethral Meatus:  No erythema or lesions present  Cervix:     Appearance healthy, no lesions present, nontender to palpation, no bleeding present  Uterus:     Uterus: firm, normal sized and nontender, anteverted in position.   Adnexa:     No adnexal tenderness present, no adnexal masses present  Perineum:     Perineum within normal limits, no evidence of trauma, no rashes or skin lesions present    Genitalia and Groin:     No rashes present, no lesions present, no areas of discoloration, no masses present       In-Clinic Test Results:  Results for orders placed or performed in visit on 12/10/20 (from the past 24 hour(s))   HCG Qual, Urine ([id number])   Result Value Ref Range    HCG Qual Urine Negative NEG^Negative       ASSESSMENT/PLAN:                                                        ICD-10-CM    1. Encounter for insertion of intrauterine contraceptive device  Z30.430 paragard intrauterine copper device     paragard intrauterine copper IUD device 1 each     INSERTION INTRAUTERINE DEVICE   2. Pre-procedure lab exam  Z01.812 HCG Qual, Urine ([id number])     We discussed all the forms of intrauterine devices and Nicole is most comfortable with the paragard IUD. It was placed as noted below.  Michaela Thompson MD  [facility]    IUD Insertion:  CONSULT:    Is a pregnancy test required: Yes.  Was it positive or negative?  Negative  Was a consent obtained?   Yes    Subjective: Nicole Sinclair is a 30 year old  presents for IUD and desires Paragard type IUD.    Patient has been given the opportunity to ask questions about all forms of birth control, including all options appropriate for Nicole Sinclair. Discussed that no method of birth control, except abstinence is 100% effective against pregnancy or sexually transmitted infection.     Nicole Sinclair understands she may have the IUD removed at any time. IUD should be removed by a health care provider.    The entire insertion procedure was reviewed with the patient, including care after placement.    Patient's last menstrual period was 2020. No allergy to betadine or shellfish. Patient declines STD screening  HCG Qual Urine   Date Value Ref Range Status   12/10/2020 Negative NEG^Negative Final     Comment:     This test is for screening purposes.  Results should be interpreted along with   the clinical picture.  Confirmation testing is available if warranted by   ordering [id number], HCG Quantitative Pregnancy.           /86   Wt 129.7 kg (286 lb)   LMP 2020   Breastfeeding No   BMI 45.47 kg/m      Pelvic Exam:   EG/BUS: normal genital architecture without lesions, erythema or abnormal secretions.   Vagina: moist, pink, rugae with physiologic discharge and secretions  Cervix: nulliparous no lesions and pink, moist, closed, without lesion or CMT  Uterus: anteverted position, mobile, no pain  Adnexa: within normal limits and no masses, nodularity, tenderness    PROCEDURE NOTE: -- IUD Insertion    Reason for Insertion: contraception    Under sterile technique, cervix was visualized with speculum and prepped with Betadine solution swab x 3. Tenaculum was placed for stability. The uterus was gently straightened and sounded to 7.0 cm. IUD prepared for placement, and IUD inserted according to 's instructions without difficulty or significant resitance, and deployed at the fundus. The  strings were visualized and trimmed to 1.5 cm from the external os. Tenaculum was removed and hemostasis noted. Speculum removed.  Patient tolerated procedure well.    NDC: [address]-6  EXP: 1/2026  LOT: [id number]    EBL: minimal    Complications: none    ASSESSMENT:     ICD-10-CM    1. Encounter for insertion of intrauterine contraceptive device  Z30.430 paragard intrauterine copper device     paragard intrauterine copper IUD device 1 each     INSERTION INTRAUTERINE DEVICE   2. Pre-procedure lab exam  Z01.812 HCG Qual, Urine ([id number])        PLAN:    Given 's handouts, including when to have IUD removed, list of danger s/sx, side effects and follow up recommended. Encouraged condom use for prevention of STD. Back up contraception advised for 7 days if progestin method. Advised to call for any fever, for prolonged or severe pain or bleeding, abnormal vaginal discharge, or unable to palpate strings. She was advised to use pain medications (ibuprofen) as needed for mild to moderate pain. Advised to follow-up in clinic in 6 weeks for IUD string check if unable to find strings or as directed by provider.     Michaela Thompson MD

## 2021-01-18 ENCOUNTER — OFFICE VISIT (OUTPATIENT)
Dept: OBGYN | Facility: CLINIC | Age: 31
End: 2021-01-18
Payer: COMMERCIAL

## 2021-01-18 VITALS
HEIGHT: 66 IN | DIASTOLIC BLOOD PRESSURE: 60 MMHG | BODY MASS INDEX: 45.64 KG/M2 | SYSTOLIC BLOOD PRESSURE: 102 MMHG | WEIGHT: 284 LBS

## 2021-01-18 DIAGNOSIS — Z30.431 IUD CHECK UP: Primary | ICD-10-CM

## 2021-01-18 PROCEDURE — 99212 OFFICE O/P EST SF 10 MIN: CPT | Performed by: OBSTETRICS & GYNECOLOGY

## 2021-01-18 ASSESSMENT — MIFFLIN-ST. JEOR: SCORE: 2024.97

## 2021-01-18 NOTE — PROGRESS NOTES
SUBJECTIVE:                                                   Nicole Sinclair is a 30 year old female who presents to clinic today for the following health issue(s):  Patient presents with:  Follow Up: Paragard IUD check      Additional information: doing well with the IUD    HPI:  Menses have now become 5-6 days and the pain is not significantly increased from before. She is happy to have normal mood.    Patient's last menstrual period was 2021..     Patient is sexually active, .  Using IUD for contraception.    reports that she has never smoked. She has never used smokeless tobacco.    STD testing offered?  Declined    Health maintenance updated:  no    Today's PHQ-2 Score:   PHQ-2 (  [employer]) 2020   Q1: Little interest or pleasure in doing things 0   Q2: Feeling down, depressed or hopeless 0   PHQ-2 Score 0   Q1: Little interest or pleasure in doing things Not at all   Q2: Feeling down, depressed or hopeless Not at all   PHQ-2 Score 0     Today's PHQ-9 Score:   PHQ-9 SCORE 2020   PHQ-9 Total Score -   PHQ-9 Total Score MyChart 4 (Minimal depression)   PHQ-9 Total Score 4     Today's FRANK-7 Score:   FRANK-7 SCORE 2020   Total Score -   Total Score 6 (mild anxiety)   Total Score 6       Problem list and histories reviewed & adjusted, as indicated.  Additional history: as documented.    Patient Active Problem List   Diagnosis     CARDIOVASCULAR SCREENING; LDL GOAL LESS THAN 160     Generalized anxiety disorder     Obesity, morbid (H)     Hypertriglyceridemia     Major depression in complete remission (H)     Irregular periods - well controlled on OCPs.     Segmental dysfunction of cervical region     Segmental dysfunction of thoracic region     Tension headache     Mechanical back pain     Past Surgical History:   Procedure Laterality Date     wisdom teeth  2008      Social History     Tobacco Use     Smoking status: Never Smoker     Smokeless tobacco: Never Used   Substance Use  "Topics     Alcohol use: Yes     Comment: Special occasions only      Problem (# of Occurrences) Relation (Name,Age of Onset)    Coronary Artery Disease (1) Paternal Grandfather    Depression (2) Mother (Mother), Maternal Grandmother (Grandmother)    Diabetes (1) Mother (Mother)    Family History Negative (2) Sister, Maternal Grandfather    Gastrointestinal Disease (1) Mother (Mother): born [birth year] ulcerative colitis     Genitourinary Problems (1) Mother (Mother): kidney transplant -?BP versus vioxx    Heart Disease (1) Paternal Grandfather:  54yo MI after MVA    Hyperlipidemia (1) Father (Father)    Hypertension (2) Mother (Mother): with kidney disease, Maternal Grandmother (Grandmother)    Lipids (1) Father (Father)    No Known Problems (2) Brother, Other    Obesity (1) Mother (Mother)    Thyroid Disease (1) Paternal Grandmother (Grandmother):  80yo pulmonary fibrosis (smoker)       Negative family history of: Breast Cancer, Colon Cancer            Current Outpatient Medications   Medication Sig     buPROPion (WELLBUTRIN XL) 300 MG 24 hr tablet Take 1 tablet (300 mg) by mouth every morning     Cholecalciferol (VITAMIN D PO) Take 2,000 Units by mouth daily.     LORazepam (ATIVAN) 1 MG tablet Take 1 tablet (1 mg) by mouth every 8 hours as needed for anxiety     paragard intrauterine copper device 1 each by Intrauterine route once     Current Facility-Administered Medications   Medication     paragard intrauterine copper IUD device 1 each     No Known Allergies    ROS:  12 point review of systems negative other than symptoms noted below or in the HPI.  No urinary frequency or dysuria, bladder or kidney problems      OBJECTIVE:     /60   Ht 1.676 m (5' 6\")   Wt 128.8 kg (284 lb)   LMP 2021   Breastfeeding No   BMI 45.84 kg/m    Body mass index is 45.84 kg/m .    Exam:  Constitutional:  Appearance: Well nourished, well developed alert, in no acute distress  Skin: General Inspection:  No " rashes present, no lesions present, no areas of discoloration.  Neurologic:  Mental Status:  Oriented X3.  Normal strength and tone, sensory exam grossly normal, mentation intact and speech normal.    Psychiatric:  Mentation appears normal and affect normal/bright.  Cervix: IUD strings seen. Normal length      In-Clinic Test Results:  No results found for this or any previous visit (from the past 24 hour(s)).    ASSESSMENT/PLAN:                                                        ICD-10-CM    1. IUD check up  Z30.431      The IUD is in the normal location. Recommend yearly check ups and self monitoring for drastic changes in her cycle as this could indicate a malpositioned IUD.    Michaela Thompson MD  [facility] [place]

## 2021-01-19 ENCOUNTER — THERAPY VISIT (OUTPATIENT)
Dept: CHIROPRACTIC MEDICINE | Facility: CLINIC | Age: 31
End: 2021-01-19
Payer: COMMERCIAL

## 2021-01-19 DIAGNOSIS — M54.9 MECHANICAL BACK PAIN: ICD-10-CM

## 2021-01-19 DIAGNOSIS — M99.02 SEGMENTAL DYSFUNCTION OF THORACIC REGION: ICD-10-CM

## 2021-01-19 DIAGNOSIS — M99.04 SEGMENTAL DYSFUNCTION OF SACRAL REGION: ICD-10-CM

## 2021-01-19 DIAGNOSIS — M99.01 SEGMENTAL DYSFUNCTION OF CERVICAL REGION: Primary | ICD-10-CM

## 2021-01-19 DIAGNOSIS — G44.209 TENSION HEADACHE: ICD-10-CM

## 2021-01-19 PROCEDURE — 98941 CHIROPRACT MANJ 3-4 REGIONS: CPT | Mod: AT | Performed by: CHIROPRACTOR

## 2021-01-19 NOTE — PROGRESS NOTES
Visit #:  7 of 8 based on treatment plan 2/18/2020    Subjective:  Nicole Sinclair is a 29 year old female who is seen in f/u up for:        Segmental dysfunction of cervical region  Segmental dysfunction of thoracic region  Tension headache  Mechanical back pain.     Since last visit on 11/24/2020,  Nicole Sinclair reports the following changes: Patient presents and states that she is doing pretty well, but her left hip has been bothering her. She notices it when she changes positions. It is worse at night. She rates her current pain 3/10. She has had a few headaches, the last 2-3 days have been worse.        Objective:  The following was observed:    P: palpatory tenderness left upper back, left lower back    A: static palpation demonstrates intersegmental asymmetry     R: motion palpation notes restricted motion    T: localized muscle spasm at: Traps Bilaterally      Assessment:    Segmental spinal dysfunction/restrictions found at:  C1 RR, LRR  C2 LR, RRR   T1 RR, LRR  T3 LR, RRR  T7 E, FR  T10 E, FR  Left SI posterior    Diagnoses:      1. Segmental dysfunction of cervical region    2. Segmental dysfunction of thoracic region    3. Tension headache    4. Mechanical back pain        Patient's condition:  Patient had restrictions pre-manipulation and Patient had decreased motion prior to manipulation    Treatment effectiveness:  Post manipulation there is better intersegmental movement and Patient claims to feel looser post manipulation      Procedures:  CMT:  30206 Chiropractic manipulative treatment 3-4 regions performed   Cervical: Diversified, C1, C2, Supine  Thoracic: Diversified, T1, T3, T7, T10, Prone  Pelvis: Drop Table, PSIS Left , Prone    Modalities:  31921: MSTM:  To Gluteal and Traps  for 5 min HYPERVOLT    Therapeutic procedures:  Gave patient Ice instructions post adjustment, and instructions for acute care      Prognosis: Good    Progress towards goals:  Decrease pain in neck and upper  "back.  Decrease intensity and frequency of headaches.  ess towards Goals:       Response to Treatment:   Reduction of symptoms with care, patient states that she feels \"lighter.\"      Recommendations:    Instructions:ice 20 minutes every other hour as needed and stretch as instructed at visit    Follow-up:  Return to care in 2-4 weeks. Patient is trying to make an effort in her health.     "

## 2021-01-30 DIAGNOSIS — F32.5 MAJOR DEPRESSION IN COMPLETE REMISSION (H): ICD-10-CM

## 2021-01-30 DIAGNOSIS — F41.1 GAD (GENERALIZED ANXIETY DISORDER): ICD-10-CM

## 2021-02-02 DIAGNOSIS — F41.1 GAD (GENERALIZED ANXIETY DISORDER): ICD-10-CM

## 2021-02-02 DIAGNOSIS — F32.5 MAJOR DEPRESSION IN COMPLETE REMISSION (H): ICD-10-CM

## 2021-02-02 RX ORDER — BUPROPION HYDROCHLORIDE 300 MG/1
TABLET ORAL
Qty: 90 TABLET | Refills: 0 | Status: SHIPPED | OUTPATIENT
Start: 2021-02-02 | End: 2021-02-19

## 2021-02-02 NOTE — TELEPHONE ENCOUNTER
"buPROPion (WELLBUTRIN XL) 300 MG 24 hr tablet 90 tablet 1 8/19/2020  No   Sig - Route: Take 1 tablet (300 mg) by mouth every morning - Oral   Sent to pharmacy as: buPROPion HCl ER (XL) 300 MG Oral Tablet Extended Release 24 Hour        Last office visit: vv 8/19/2020     Future Office Visit:          Requested Prescriptions   Pending Prescriptions Disp Refills     buPROPion (WELLBUTRIN XL) 300 MG 24 hr tablet [Pharmacy Med Name: BUPROPION HCL ER (XL) 300MG TB24] 90 tablet 0     Sig: TAKE ONE TABLET BY MOUTH EVERY MORNING       SSRIs Protocol Passed - 1/30/2021  8:35 AM        Passed - PHQ-9 score less than 5 in past 6 months     Please review last PHQ-9 score.           Passed - Medication is Bupropion     If the medication is Bupropion (Wellbutrin), and the patient is taking for smoking cessation; OK to refill.          Passed - Medication is active on med list        Passed - Patient is age 18 or older        Passed - No active pregnancy on record        Passed - No positive pregnancy test in last 12 months        Passed - Recent (6 mo) or future (30 days) visit within the authorizing provider's specialty     Patient had office visit in the last 6 months or has a visit in the next 30 days with authorizing provider or within the authorizing provider's specialty.  See \"Patient Info\" tab in inbasket, or \"Choose Columns\" in Meds & Orders section of the refill encounter.               Refill per RN protocol     Amita Livingston RN, BSN  [facility]     "

## 2021-02-03 RX ORDER — BUPROPION HYDROCHLORIDE 300 MG/1
300 TABLET ORAL EVERY MORNING
Qty: 90 TABLET | Refills: 1 | OUTPATIENT
Start: 2021-02-03

## 2021-02-03 NOTE — TELEPHONE ENCOUNTER
Due for routine physical in Feb, but has not yet scheduled. Please notify and assist with scheduling if needed.  Electronically Signed By: Marquita Gonzalez PA-C

## 2021-02-03 NOTE — TELEPHONE ENCOUNTER
#90 x 0 refill sent 2/2/21  No pharmacy change  Rx denied. Pharmacy notified.       Perla Tobias RN  [facility]

## 2021-02-19 ENCOUNTER — VIRTUAL VISIT (OUTPATIENT)
Dept: FAMILY MEDICINE | Facility: CLINIC | Age: 31
End: 2021-02-19
Payer: COMMERCIAL

## 2021-02-19 DIAGNOSIS — M26.609 TMJ (TEMPOROMANDIBULAR JOINT SYNDROME): ICD-10-CM

## 2021-02-19 DIAGNOSIS — F41.1 GAD (GENERALIZED ANXIETY DISORDER): Primary | ICD-10-CM

## 2021-02-19 DIAGNOSIS — F32.5 MAJOR DEPRESSION IN COMPLETE REMISSION (H): ICD-10-CM

## 2021-02-19 PROCEDURE — 99214 OFFICE O/P EST MOD 30 MIN: CPT | Mod: TEL | Performed by: NURSE PRACTITIONER

## 2021-02-19 PROCEDURE — 96127 BRIEF EMOTIONAL/BEHAV ASSMT: CPT | Mod: TEL | Performed by: NURSE PRACTITIONER

## 2021-02-19 RX ORDER — BUPROPION HYDROCHLORIDE 300 MG/1
300 TABLET ORAL EVERY MORNING
Qty: 90 TABLET | Refills: 3 | Status: SHIPPED | OUTPATIENT
Start: 2021-02-19 | End: 2022-05-12

## 2021-02-19 RX ORDER — CYCLOBENZAPRINE HCL 5 MG
2.5-5 TABLET ORAL
Qty: 20 TABLET | Refills: 0 | Status: SHIPPED | OUTPATIENT
Start: 2021-02-19 | End: 2022-05-26

## 2021-02-19 ASSESSMENT — ANXIETY QUESTIONNAIRES
2. NOT BEING ABLE TO STOP OR CONTROL WORRYING: SEVERAL DAYS
6. BECOMING EASILY ANNOYED OR IRRITABLE: SEVERAL DAYS
IF YOU CHECKED OFF ANY PROBLEMS ON THIS QUESTIONNAIRE, HOW DIFFICULT HAVE THESE PROBLEMS MADE IT FOR YOU TO DO YOUR WORK, TAKE CARE OF THINGS AT HOME, OR GET ALONG WITH OTHER PEOPLE: NOT DIFFICULT AT ALL
3. WORRYING TOO MUCH ABOUT DIFFERENT THINGS: SEVERAL DAYS
5. BEING SO RESTLESS THAT IT IS HARD TO SIT STILL: NOT AT ALL
1. FEELING NERVOUS, ANXIOUS, OR ON EDGE: MORE THAN HALF THE DAYS
7. FEELING AFRAID AS IF SOMETHING AWFUL MIGHT HAPPEN: NOT AT ALL
GAD7 TOTAL SCORE: 6

## 2021-02-19 ASSESSMENT — PATIENT HEALTH QUESTIONNAIRE - PHQ9
SUM OF ALL RESPONSES TO PHQ QUESTIONS 1-9: 6
5. POOR APPETITE OR OVEREATING: SEVERAL DAYS

## 2021-02-19 NOTE — PROGRESS NOTES
Nicole is a 30 year old who is being evaluated via a billable telephone visit.      What phone number would you like to be contacted at? [phone]  How would you like to obtain your AVS? MyChart    Assessment & Plan     Nicole was seen today for depression.    Diagnoses and all orders for this visit:    FRANK (generalized anxiety disorder)  Major depression in complete remission (H)  FRANK-7 SCORE 8/19/2020 12/8/2020 2/19/2021   Total Score - - -   Total Score 4 (minimal anxiety) 6 (mild anxiety) -   Total Score 4 6 6     PHQ 8/19/2020 12/8/2020 2/19/2021   PHQ-9 Total Score 4 4 6   Q9: Thoughts of better off dead/self-harm past 2 weeks Not at all Not at all Not at all     Stable, well controlled on Wellbutrin  mg daily.    -     buPROPion (WELLBUTRIN XL) 300 MG 24 hr tablet; Take 1 tablet (300 mg) by mouth every morning    TMJ (temporomandibular joint syndrome)  Recommended NSAID's, gentle massage and heat application.  As needed use of muscle relaxants.    -     cyclobenzaprine (FLEXERIL) 5 MG tablet; Take 0.5-1 tablets (2.5-5 mg) by mouth nightly as needed for muscle spasms        Return in about 6 months (around 8/19/2021) for Med check.    Greer Mays, APRAKILAH [facility]   Nicole is a 30 year old who presents for the following health issues      HPI       Depression and Anxiety Follow-Up    Added anxiety related to pandemic.   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    Continues with therapy, every 3 week appts.   Uses [facility].     Jaw tension, previously had this in nursing school.  Started 2-3 weeks, worse on left side.    Clench teeth, no known grinding.    Previously treated with Flexeril.    Intermittent tension headaches.        How are you doing with your depression since your last visit? No change    How are you doing with your anxiety since your last visit?  Worsened due to life of covid     Are you having other symptoms that  might be associated with depression or anxiety? Yes:  had this issue a long time when in school gets anxious clecnhes jaw - has had alot of jaw tension in the last 2-3 weeks     Have you had a significant life event? No     Do you have any concerns with your use of alcohol or other drugs? No    Social History     Tobacco Use     Smoking status: Never Smoker     Smokeless tobacco: Never Used   Substance Use Topics     Alcohol use: Yes     Comment: Special occasions only     Drug use: No     PHQ 8/19/2020 12/8/2020 2/19/2021   PHQ-9 Total Score 4 4 6   Q9: Thoughts of better off dead/self-harm past 2 weeks Not at all Not at all Not at all     FRANK-7 SCORE 8/19/2020 12/8/2020 2/19/2021   Total Score - - -   Total Score 4 (minimal anxiety) 6 (mild anxiety) -   Total Score 4 6 6         Suicide Assessment Five-step Evaluation and Treatment (SAFE-T)      How many servings of fruits and vegetables do you eat daily?  2-3    On average, how many sweetened beverages do you drink each day (Examples: soda, juice, sweet tea, etc.  Do NOT count diet or artificially sweetened beverages)?   0    How many days per week do you exercise enough to make your heart beat faster? 3 or less    How many minutes a day do you exercise enough to make your heart beat faster? 20 - 29    How many days per week do you miss taking your medication? 0        Review of Systems   Constitutional, HEENT, cardiovascular, pulmonary, gi and gu systems are negative, except as otherwise noted.      Objective           Vitals:  No vitals were obtained today due to virtual visit.    Physical Exam   General:  healthy, alert and no distress  PSYCH: Alert and oriented times 3; coherent speech, normal   rate and volume, able to articulate logical thoughts, able   to abstract reason, no tangential thoughts, no hallucinations   or delusions  Her affect is normal  RESP: No cough, no audible wheezing, able to talk in full sentences  Remainder of exam unable to be  completed due to telephone visits            Phone call duration:  minutes    4:29 p.m. to 4:37 p.m.

## 2021-02-20 ASSESSMENT — ANXIETY QUESTIONNAIRES: GAD7 TOTAL SCORE: 6

## 2021-03-28 ENCOUNTER — OFFICE VISIT (OUTPATIENT)
Dept: URGENT CARE | Facility: URGENT CARE | Age: 31
End: 2021-03-28
Payer: COMMERCIAL

## 2021-03-28 VITALS
HEIGHT: 66 IN | SYSTOLIC BLOOD PRESSURE: 128 MMHG | HEART RATE: 99 BPM | WEIGHT: 293 LBS | TEMPERATURE: 98.2 F | DIASTOLIC BLOOD PRESSURE: 78 MMHG | RESPIRATION RATE: 18 BRPM | BODY MASS INDEX: 47.09 KG/M2 | OXYGEN SATURATION: 100 %

## 2021-03-28 DIAGNOSIS — N39.0 ACUTE UTI: Primary | ICD-10-CM

## 2021-03-28 DIAGNOSIS — R30.0 DYSURIA: ICD-10-CM

## 2021-03-28 LAB
ALBUMIN UR-MCNC: NEGATIVE MG/DL
APPEARANCE UR: CLEAR
BACTERIA #/AREA URNS HPF: ABNORMAL /HPF
BILIRUB UR QL STRIP: NEGATIVE
COLOR UR AUTO: YELLOW
GLUCOSE UR STRIP-MCNC: NEGATIVE MG/DL
HGB UR QL STRIP: ABNORMAL
KETONES UR STRIP-MCNC: NEGATIVE MG/DL
LEUKOCYTE ESTERASE UR QL STRIP: NEGATIVE
NITRATE UR QL: NEGATIVE
NON-SQ EPI CELLS #/AREA URNS LPF: ABNORMAL /LPF
PH UR STRIP: 6 PH (ref 5–7)
RBC #/AREA URNS AUTO: ABNORMAL /HPF
SOURCE: ABNORMAL
SP GR UR STRIP: <=1.005 (ref 1–1.03)
UROBILINOGEN UR STRIP-ACNC: 0.2 EU/DL (ref 0.2–1)
WBC #/AREA URNS AUTO: ABNORMAL /HPF

## 2021-03-28 PROCEDURE — 87086 URINE CULTURE/COLONY COUNT: CPT | Performed by: PHYSICIAN ASSISTANT

## 2021-03-28 PROCEDURE — 87088 URINE BACTERIA CULTURE: CPT | Performed by: PHYSICIAN ASSISTANT

## 2021-03-28 PROCEDURE — 81001 URINALYSIS AUTO W/SCOPE: CPT | Performed by: PHYSICIAN ASSISTANT

## 2021-03-28 PROCEDURE — 87186 SC STD MICRODIL/AGAR DIL: CPT | Performed by: PHYSICIAN ASSISTANT

## 2021-03-28 PROCEDURE — 99213 OFFICE O/P EST LOW 20 MIN: CPT | Performed by: PHYSICIAN ASSISTANT

## 2021-03-28 RX ORDER — CEFDINIR 300 MG/1
300 CAPSULE ORAL 2 TIMES DAILY
Qty: 10 CAPSULE | Refills: 0 | Status: SHIPPED | OUTPATIENT
Start: 2021-03-28 | End: 2021-04-02

## 2021-03-28 RX ORDER — PHENAZOPYRIDINE HYDROCHLORIDE 100 MG/1
100 TABLET, FILM COATED ORAL 3 TIMES DAILY PRN
Qty: 9 TABLET | Refills: 0 | Status: SHIPPED | OUTPATIENT
Start: 2021-03-28 | End: 2022-05-26

## 2021-03-28 ASSESSMENT — MIFFLIN-ST. JEOR: SCORE: 2074.86

## 2021-03-28 NOTE — PROGRESS NOTES
"Patient presents with:  Urgent Care  UTI: possible UTI     (N39.0) Acute UTI  (primary encounter diagnosis)  Comment:   Plan: cefdinir (OMNICEF) 300 MG capsule,         phenazopyridine (PYRIDIUM) 100 MG tablet            (R30.0) Dysuria  Comment:   Plan: UA reflex to Microscopic and Culture, Urine         Microscopic, Urine Culture Aerobic Bacterial                SUBJECTIVE:   Nicole Sinclair is a 30 year old female who presents today with dysuria 3 days ago, followed by urgency.  Last night the urgency worsened to near incontinence.      Denies any vaginal discharge.    Denies any fevers.  Did feel a bit chilled last night.      LMP 3/26/21, with some cramping.  Some mild lower abdominal pain and low back discomfort.          Past Medical History:   Diagnosis Date     Depression with anxiety      Depressive disorder March 2009     Mild major depression (H) 4/16/2009     Obesity, morbid (H)          Current Outpatient Medications   Medication Sig Dispense Refill     Multiple Vitamins-Iron (DAILY-MAYKEL/IRON/BETA-CAROTENE) TABS TAKE 1 TABLET BY MOUTH DAILY. (Patient not taking: Reported on 10/19/2020) 30 tablet 7     Social History     Tobacco Use     Smoking status: Never Smoker     Smokeless tobacco: Never Used   Substance Use Topics     Alcohol use: Not on file     Family History   Problem Relation Age of Onset     Diabetes Mother      Diabetes Father          ROS:    10 point ROS of systems including Constitutional, Eyes, Respiratory, Cardiovascular, Gastroenterology, Genitourinary, Integumentary, Muscularskeletal, Psychiatric ,neurological were all negative except for pertinent positives noted in my HPI       OBJECTIVE:  /78   Pulse 99   Temp 98.2  F (36.8  C)   Resp 18   Ht 1.676 m (5' 6\")   Wt 133.8 kg (295 lb)   SpO2 100%   BMI 47.61 kg/m    Physical Exam:  GENERAL APPEARANCE: healthy, alert and no distress  ABDOMEN:  soft, nontender, no HSM or masses and bowel sounds normal  BACK: NO CVAT  NEURO: " Normal strength and tone, sensory exam grossly normal,  normal speech and mentation  SKIN: no suspicious lesions or rashes

## 2021-03-28 NOTE — LETTER
[facility]  [address] [address]  [phone]      March 28, 2021    RE:  Nicole Sinclair                                                                                                                                                       [address] [place] [id number]            To whom it may concern:    Nicole Sinclair was seen in clinic today, she may return to work tomorrow.        Sincerely,        Pham Pinon PA-C    [facility]

## 2021-03-28 NOTE — PATIENT INSTRUCTIONS
Patient Education     Urinary Tract Infections in Women  Urinary tract infections (UTIs) are most often caused by bacteria. These bacteria enter the urinary tract. The bacteria may come from inside the body. Or they may travel from the skin outside the rectum or vagina into the urethra. Female anatomy makes it easy for bacteria from the bowel to enter a woman s urinary tract, which is the most common source of UTI. This means women develop UTIs more often than men. Pain in or around the urinary tract is a common UTI symptom. But the only way to know for sure if you have a UTI for the healthcare provider to test your urine. The two tests that may be done are the urinalysis and urine culture.     Types of UTIs    Cystitis. A bladder infection (cystitis) is the most common UTI in women. You may have urgent or frequent need to pee. You may also have pain, burning when you pee, and bloody urine.    Urethritis. This is an inflamed urethra, which is the tube that carries urine from the bladder to outside the body. You may have lower stomach or back pain. You may also have urgent or frequent need to pee.    Pyelonephritis. This is a kidney infection. If not treated, it can be serious and damage your kidneys. In severe cases, you may need to stay in the hospital. You may have a fever and lower back pain.    Medicines to treat a UTI  Most UTIs are treated with antibiotics. These kill the bacteria. The length of time you need to take them depends on the type of infection. It may be as short as 3 days. If you have repeated UTIs, you may need a low-dose antibiotic for several months. Take antibiotics exactly as directed. Don t stop taking them until all of the medicine is gone. If you stop taking the antibiotic too soon, the infection may not go away. You may also develop a resistance to the antibiotic. This can make it much harder to treat.   Lifestyle changes to treat and prevent UTIs   The lifestyle changes below will help  get rid of your UTI. They may also help prevent future UTIs.     Drink plenty of fluids. This includes water, juice, or other caffeine-free drinks. Fluids help flush bacteria out of your body.    Empty your bladder. Always empty your bladder when you feel the urge to pee. And always pee before going to sleep. Urine that stays in your bladder can lead to infection. Try to pee before and after sex as well.    Practice good personal hygiene. Wipe yourself from front to back after using the toilet. This helps keep bacteria from getting into the urethra.    Use condoms during sex. These help prevent UTIs caused by sexually transmitted bacteria. Also don't use spermicides during sex. These can increase the risk for UTIs. Choose other forms of birth control instead. For women who tend to get UTIs after sex, a low-dose of a preventive antibiotic may be used. Be sure to discuss this option with your healthcare provider.    Follow up with your healthcare provider as directed. He or she may test to make sure the infection has cleared. If needed, more treatment may be started.  [employer] last reviewed this educational content on 7/1/2019 2000-2020 [employer]. All rights reserved. This information is not intended as a substitute for professional medical care. Always follow your healthcare professional's instructions.

## 2021-03-31 LAB
BACTERIA SPEC CULT: ABNORMAL
BACTERIA SPEC CULT: ABNORMAL
Lab: ABNORMAL
SPECIMEN SOURCE: ABNORMAL

## 2021-09-18 ENCOUNTER — HEALTH MAINTENANCE LETTER (OUTPATIENT)
Age: 31
End: 2021-09-18

## 2021-10-14 ENCOUNTER — IMMUNIZATION (OUTPATIENT)
Dept: NURSING | Facility: CLINIC | Age: 31
End: 2021-10-14
Payer: COMMERCIAL

## 2021-10-14 PROCEDURE — 0004A PR COVID VAC PFIZER DIL RECON 30 MCG/0.3 ML IM: CPT

## 2021-10-14 PROCEDURE — 91300 PR COVID VAC PFIZER DIL RECON 30 MCG/0.3 ML IM: CPT

## 2021-10-19 PROBLEM — F32.9 MAJOR DEPRESSION: Status: ACTIVE | Noted: 2018-05-30

## 2022-01-08 ENCOUNTER — HEALTH MAINTENANCE LETTER (OUTPATIENT)
Age: 32
End: 2022-01-08

## 2022-05-24 ASSESSMENT — ENCOUNTER SYMPTOMS
HEMATURIA: 0
PARESTHESIAS: 0
CHILLS: 0
SORE THROAT: 0
WEAKNESS: 0
COUGH: 0
DYSURIA: 0
PALPITATIONS: 0
SHORTNESS OF BREATH: 0
HEMATOCHEZIA: 0
HEARTBURN: 0
MYALGIAS: 0
FREQUENCY: 0
HEADACHES: 0
ABDOMINAL PAIN: 0
BREAST MASS: 0
DIZZINESS: 0
FEVER: 0
JOINT SWELLING: 0
ARTHRALGIAS: 0
NAUSEA: 0
CONSTIPATION: 0
EYE PAIN: 0
DIARRHEA: 0
NERVOUS/ANXIOUS: 0

## 2022-05-24 ASSESSMENT — PATIENT HEALTH QUESTIONNAIRE - PHQ9
10. IF YOU CHECKED OFF ANY PROBLEMS, HOW DIFFICULT HAVE THESE PROBLEMS MADE IT FOR YOU TO DO YOUR WORK, TAKE CARE OF THINGS AT HOME, OR GET ALONG WITH OTHER PEOPLE: SOMEWHAT DIFFICULT
SUM OF ALL RESPONSES TO PHQ QUESTIONS 1-9: 9
SUM OF ALL RESPONSES TO PHQ QUESTIONS 1-9: 9

## 2022-05-26 ENCOUNTER — OFFICE VISIT (OUTPATIENT)
Dept: FAMILY MEDICINE | Facility: CLINIC | Age: 32
End: 2022-05-26
Payer: COMMERCIAL

## 2022-05-26 VITALS
SYSTOLIC BLOOD PRESSURE: 122 MMHG | WEIGHT: 293 LBS | HEIGHT: 67 IN | BODY MASS INDEX: 45.99 KG/M2 | TEMPERATURE: 98.4 F | HEART RATE: 100 BPM | OXYGEN SATURATION: 97 % | DIASTOLIC BLOOD PRESSURE: 78 MMHG

## 2022-05-26 DIAGNOSIS — N92.6 IRREGULAR PERIODS: ICD-10-CM

## 2022-05-26 DIAGNOSIS — R06.83 SNORING: ICD-10-CM

## 2022-05-26 DIAGNOSIS — Z97.5 IUD (INTRAUTERINE DEVICE) IN PLACE: ICD-10-CM

## 2022-05-26 DIAGNOSIS — Z11.59 NEED FOR HEPATITIS C SCREENING TEST: ICD-10-CM

## 2022-05-26 DIAGNOSIS — Z13.1 SCREENING FOR DIABETES MELLITUS: ICD-10-CM

## 2022-05-26 DIAGNOSIS — F32.5 MAJOR DEPRESSION IN COMPLETE REMISSION (H): ICD-10-CM

## 2022-05-26 DIAGNOSIS — E66.01 MORBID OBESITY (H): ICD-10-CM

## 2022-05-26 DIAGNOSIS — Z00.00 ROUTINE GENERAL MEDICAL EXAMINATION AT A HEALTH CARE FACILITY: Primary | ICD-10-CM

## 2022-05-26 DIAGNOSIS — F41.1 GAD (GENERALIZED ANXIETY DISORDER): ICD-10-CM

## 2022-05-26 DIAGNOSIS — Z12.4 CERVICAL CANCER SCREENING: ICD-10-CM

## 2022-05-26 LAB
ERYTHROCYTE [DISTWIDTH] IN BLOOD BY AUTOMATED COUNT: 13.2 % (ref 10–15)
HCT VFR BLD AUTO: 40.5 % (ref 35–47)
HCV AB SERPL QL IA: NONREACTIVE
HGB BLD-MCNC: 13.1 G/DL (ref 11.7–15.7)
MCH RBC QN AUTO: 28.5 PG (ref 26.5–33)
MCHC RBC AUTO-ENTMCNC: 32.3 G/DL (ref 31.5–36.5)
MCV RBC AUTO: 88 FL (ref 78–100)
PLATELET # BLD AUTO: 244 10E3/UL (ref 150–450)
RBC # BLD AUTO: 4.59 10E6/UL (ref 3.8–5.2)
WBC # BLD AUTO: 8.7 10E3/UL (ref 4–11)

## 2022-05-26 PROCEDURE — 80053 COMPREHEN METABOLIC PANEL: CPT | Performed by: PHYSICIAN ASSISTANT

## 2022-05-26 PROCEDURE — 87624 HPV HI-RISK TYP POOLED RSLT: CPT | Performed by: PHYSICIAN ASSISTANT

## 2022-05-26 PROCEDURE — G0145 SCR C/V CYTO,THINLAYER,RESCR: HCPCS | Performed by: PHYSICIAN ASSISTANT

## 2022-05-26 PROCEDURE — 84443 ASSAY THYROID STIM HORMONE: CPT | Performed by: PHYSICIAN ASSISTANT

## 2022-05-26 PROCEDURE — 99395 PREV VISIT EST AGE 18-39: CPT | Performed by: PHYSICIAN ASSISTANT

## 2022-05-26 PROCEDURE — 99214 OFFICE O/P EST MOD 30 MIN: CPT | Mod: 25 | Performed by: PHYSICIAN ASSISTANT

## 2022-05-26 PROCEDURE — 96127 BRIEF EMOTIONAL/BEHAV ASSMT: CPT | Performed by: PHYSICIAN ASSISTANT

## 2022-05-26 PROCEDURE — 84439 ASSAY OF FREE THYROXINE: CPT | Performed by: PHYSICIAN ASSISTANT

## 2022-05-26 PROCEDURE — 85027 COMPLETE CBC AUTOMATED: CPT | Performed by: PHYSICIAN ASSISTANT

## 2022-05-26 PROCEDURE — 86803 HEPATITIS C AB TEST: CPT | Performed by: PHYSICIAN ASSISTANT

## 2022-05-26 PROCEDURE — 36415 COLL VENOUS BLD VENIPUNCTURE: CPT | Performed by: PHYSICIAN ASSISTANT

## 2022-05-26 RX ORDER — BUPROPION HYDROCHLORIDE 300 MG/1
300 TABLET ORAL EVERY MORNING
Qty: 90 TABLET | Refills: 3 | Status: SHIPPED | OUTPATIENT
Start: 2022-05-26 | End: 2023-06-22

## 2022-05-26 ASSESSMENT — ENCOUNTER SYMPTOMS
SORE THROAT: 0
HEARTBURN: 0
JOINT SWELLING: 0
HEMATOCHEZIA: 0
BREAST MASS: 0
WEAKNESS: 0
PARESTHESIAS: 0
NAUSEA: 0
HEMATURIA: 0
DIARRHEA: 0
PALPITATIONS: 0
CONSTIPATION: 0
MYALGIAS: 0
ABDOMINAL PAIN: 0
DIZZINESS: 0
DYSURIA: 0
ARTHRALGIAS: 0
EYE PAIN: 0
CHILLS: 0
COUGH: 0
HEADACHES: 0
FEVER: 0
NERVOUS/ANXIOUS: 0
FREQUENCY: 0
SHORTNESS OF BREATH: 0

## 2022-05-26 ASSESSMENT — ANXIETY QUESTIONNAIRES
IF YOU CHECKED OFF ANY PROBLEMS ON THIS QUESTIONNAIRE, HOW DIFFICULT HAVE THESE PROBLEMS MADE IT FOR YOU TO DO YOUR WORK, TAKE CARE OF THINGS AT HOME, OR GET ALONG WITH OTHER PEOPLE: SOMEWHAT DIFFICULT
3. WORRYING TOO MUCH ABOUT DIFFERENT THINGS: SEVERAL DAYS
2. NOT BEING ABLE TO STOP OR CONTROL WORRYING: NOT AT ALL
5. BEING SO RESTLESS THAT IT IS HARD TO SIT STILL: NOT AT ALL
1. FEELING NERVOUS, ANXIOUS, OR ON EDGE: SEVERAL DAYS
7. FEELING AFRAID AS IF SOMETHING AWFUL MIGHT HAPPEN: NOT AT ALL

## 2022-05-26 ASSESSMENT — PATIENT HEALTH QUESTIONNAIRE - PHQ9: 5. POOR APPETITE OR OVEREATING: SEVERAL DAYS

## 2022-05-26 NOTE — PROGRESS NOTES
"   SUBJECTIVE:   CC: Nicole Sinclair is an 32 year old woman who presents for preventive health visit.     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 Had been doing bed-side nursing, but unable to balance a lot of things in her life. Had to work 5-6 days straight. Hours better and more mentally challenging for her which she likes.     Has paragard IUD - had normal menses over past 1 yr after placement, but over past 5-6 months more irregular. Estimates cycle length anywhere from 30-45 days. Bleeding for 5 days, but did have light spotting one time for 2 weeks. Uses menstrual cup. LMP 4/26. Denies any concern for STD as they each were each other's first partner.    Depression and Anxiety Follow-Up; continues on wellbutrin 300mg daily. Has a lot of changes in the past few years - good changes - moved, new job, new relationship (met online, he moved here from [place] to be with her).  Has gained 30 lbs and has been overweight most of her adult life. Issues sleeping which makes other things worse. Boyfriend reported her snoring got worse and rarely may have heard an apnea. Mom and mat gma both have SANNA. In process of trying to make changes - lost 6 lbs in the past 1 month and knows weight can be contributing factor sleep apnea. Really would like to avoid having CPAP. Admits sitting at desk all day long with new job likely " contributed to her weight gain as well. Trying to increase her exercise again.   Has therapist she's seen since for past 5-6 yrs; only talks with her 1x per month since things are stable. Knows she eats when unhappy and this contributes to negative weight gain. Hasn't addressed with therapist or ever seen weight loss clinic, but would be interested in this.  Feels wellbutrin current dose otherwise is working well for her. Feels residual mood concerns are related to getting her sleep and health back on track.  Has ativan on hand for break-through panic and just helps to know that she has it. Last this was filled was 2020.      How are you doing with your depression since your last visit? No change    How are you doing with your anxiety since your last visit?  No change    Are you having other symptoms that might be associated with depression or anxiety? No    Have you had a significant life event? OTHER: Moved, new relationship, new job     Do you have any concerns with your use of alcohol or other drugs? No    Social History     Tobacco Use     Smoking status: Never Smoker     Smokeless tobacco: Never Used   Substance Use Topics     Alcohol use: Yes     Comment: Special occasions only     Drug use: No     PHQ 12/8/2020 2/19/2021 5/24/2022   PHQ-9 Total Score 4 6 9   Q9: Thoughts of better off dead/self-harm past 2 weeks Not at all Not at all Not at all     FRANK-7 SCORE 8/19/2020 12/8/2020 2/19/2021   Total Score - - -   Total Score 4 (minimal anxiety) 6 (mild anxiety) -   Total Score 4 6 6     Last PHQ-9 5/24/2022   1.  Little interest or pleasure in doing things 0   2.  Feeling down, depressed, or hopeless 0   3.  Trouble falling or staying asleep, or sleeping too much 2   4.  Feeling tired or having little energy 2   5.  Poor appetite or overeating 3   6.  Feeling bad about yourself 1   7.  Trouble concentrating 1   8.  Moving slowly or restless 0   Q9: Thoughts of better off dead/self-harm past 2 weeks 0   PHQ-9  Total Score 9   Difficulty at work, home, or with people -     FRANK-7  5/26/2022   1. Feeling nervous, anxious, or on edge 1   2. Not being able to stop or control worrying 0   3. Worrying too much about different things 1   4. Trouble relaxing 1   5. Being so restless that it is hard to sit still 0   6. Becoming easily annoyed or irritable -   7. Feeling afraid, as if something awful might happen 0   FRANK-7 Total Score -   If you checked any problems, how difficult have they made it for you to do your work, take care of things at home, or get along with other people? Somewhat difficult       Suicide Assessment Five-step Evaluation and Treatment (SAFE-T)      Abuse: Current or Past (Physical, Sexual or Emotional) - No  Do you feel safe in your environment? Yes    Social History     Tobacco Use     Smoking status: Never Smoker     Smokeless tobacco: Never Used   Substance Use Topics     Alcohol use: Yes     Comment: Special occasions only     If you drink alcohol do you typically have >3 drinks per day or >7 drinks per week? No    Alcohol Use 5/24/2022   Prescreen: >3 drinks/day or >7 drinks/week? No   Prescreen: >3 drinks/day or >7 drinks/week? -   No flowsheet data found.    Reviewed orders with patient.  Reviewed health maintenance and updated orders accordingly - Yes  BP Readings from Last 3 Encounters:   05/26/22 122/78   03/28/21 128/78   01/18/21 102/60    Wt Readings from Last 3 Encounters:   05/26/22 142.4 kg (314 lb)   03/28/21 133.8 kg (295 lb)   01/18/21 128.8 kg (284 lb)                  Patient Active Problem List   Diagnosis     CARDIOVASCULAR SCREENING; LDL GOAL LESS THAN 160     Generalized anxiety disorder     Obesity, morbid (H)     Hypertriglyceridemia     Major depression in complete remission (H)     Irregular periods      Segmental dysfunction of cervical region     Segmental dysfunction of thoracic region     Tension headache     Mechanical back pain     Past Surgical History:   Procedure  Laterality Date     wisdom teeth  2008       Social History     Tobacco Use     Smoking status: Never Smoker     Smokeless tobacco: Never Used   Substance Use Topics     Alcohol use: Yes     Comment: Special occasions only     Family History   Problem Relation Age of Onset     Gastrointestinal Disease Mother         born [birth year] ulcerative colitis      Hypertension Mother         with kidney disease     Diabetes Mother      Genitourinary Problems Mother         kidney transplant -?BP versus vioxx     Depression Mother      Obesity Mother      Lipids Father      Hyperlipidemia Father      Family History Negative Sister      Hypertension Maternal Grandmother      Depression Maternal Grandmother      Family History Negative Maternal Grandfather      Thyroid Disease Paternal Grandmother          80yo pulmonary fibrosis (smoker)     Heart Disease Paternal Grandfather          54yo MI after MVA     Coronary Artery Disease Paternal Grandfather      No Known Problems Brother      No Known Problems Other      Breast Cancer No family hx of      Colon Cancer No family hx of          Current Outpatient Medications   Medication Sig Dispense Refill     buPROPion (WELLBUTRIN XL) 300 MG 24 hr tablet TAKE 1 TABLET (300 MG) BY MOUTH EVERY MORNING 30 tablet 0     Cholecalciferol (VITAMIN D PO) Take 2,000 Units by mouth daily.       cyclobenzaprine (FLEXERIL) 5 MG tablet Take 0.5-1 tablets (2.5-5 mg) by mouth nightly as needed for muscle spasms 20 tablet 0     LORazepam (ATIVAN) 1 MG tablet Take 1 tablet (1 mg) by mouth every 8 hours as needed for anxiety 20 tablet 0     paragard intrauterine copper device 1 each by Intrauterine route once       No Known Allergies    Breast Cancer Screening:    FHS-7:   Breast CA Risk Assessment (FHS-7) 2022   Did any of your first-degree relatives have breast or ovarian cancer? No   Did any of your relatives have bilateral breast cancer? No   Did any man in your family have breast  cancer? No   Did any woman in your family have breast and ovarian cancer? No   Did any woman in your family have breast cancer before age 50 y? No   Do you have 2 or more relatives with breast and/or ovarian cancer? No   Do you have 2 or more relatives with breast and/or bowel cancer? Yes - maternal uncle and great mat grandparent       Patient under 40 years of age: Routine Mammogram Screening not recommended.   Pertinent mammograms are reviewed under the imaging tab.    History of abnormal Pap smear: NO - age 30- 65 PAP every 3 years recommended  PAP / HPV Latest Ref Rng & Units 4/26/2019 11/13/2015 11/21/2012   PAP (Historical) - NIL NIL NIL   HPV16 NEG - Negative -   HPV18 NEG - Negative -   HRHPV NEG - Negative -     Reviewed and updated as needed this visit by clinical staff   Tobacco  Allergies  Meds   Med Hx  Surg Hx  Fam Hx  Soc Hx          Reviewed and updated as needed this visit by Provider                     Review of Systems   Constitutional: Negative for chills and fever.   HENT: Negative for congestion, ear pain, hearing loss and sore throat.    Eyes: Negative for pain and visual disturbance.   Respiratory: Negative for cough and shortness of breath.    Cardiovascular: Negative for chest pain, palpitations and peripheral edema.   Gastrointestinal: Negative for abdominal pain, constipation, diarrhea, heartburn, hematochezia and nausea.   Breasts:  Negative for tenderness, breast mass and discharge.   Genitourinary: Negative for dysuria, frequency, genital sores, hematuria, pelvic pain, urgency, vaginal bleeding and vaginal discharge.   Musculoskeletal: Negative for arthralgias, joint swelling and myalgias.   Skin: Negative for rash.   Neurological: Negative for dizziness, weakness, headaches and paresthesias.   Psychiatric/Behavioral: Negative for mood changes. The patient is not nervous/anxious.         OBJECTIVE:   /78 (BP Location: Left arm, Patient Position: Sitting, Cuff Size: Adult  "Large)   Pulse 100   Temp 98.4  F (36.9  C) (Tympanic)   Ht 1.695 m (5' 6.75\")   Wt 142.4 kg (314 lb)   LMP 04/26/2022 (Exact Date)   SpO2 97%   Breastfeeding No   BMI 49.55 kg/m    Physical Exam  GENERAL: healthy, alert and no distress  EYES: Eyes grossly normal to inspection, PERRL and conjunctivae and sclerae normal  HENT: ear canals and TM's normal, nose and mouth without ulcers or lesions  NECK: no adenopathy, no asymmetry, masses, or scars and thyroid normal to palpation  RESP: lungs clear to auscultation - no rales, rhonchi or wheezes  BREAST: normal without masses, tenderness or nipple discharge and no palpable axillary masses or adenopathy  CV: regular rate and rhythm, normal S1 S2, no S3 or S4, no murmur, click or rub, no peripheral edema and peripheral pulses strong  ABDOMEN: soft, nontender, no hepatosplenomegaly, no masses and bowel sounds normal   (female): normal female external genitalia, normal urethral meatus, vaginal mucosa pink, moist, well rugated, and normal cervix/adnexa/uterus without masses or discharge. IUD strings seen exiting cervical os.  MS: no gross musculoskeletal defects noted, no edema  SKIN: no suspicious lesions or rashes  NEURO: Normal strength and tone, mentation intact and speech normal  PSYCH: mentation appears normal, affect normal/bright    Diagnostic Test Results:  Labs reviewed in Epic    ASSESSMENT/PLAN:   Nicole was seen today for physical, sleep problem and weight problem.    Diagnoses and all orders for this visit:    Routine general medical examination at a health care facility  -     Reviewed preventative health recommendations for age.  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 Much happier with work/life balance, but more sedentary and recognizing need to increase her exercise. Sleep has been worse and feels these are all related to " worsening healthy life-style and weight gain. Encouraged to review with her therapist and will also refer for sleep apnea to rule out SANNA as likely would help facilitate better sleep and adherence to exercise program with feeling better rested. Refer to comprehensive weight management as well to assist with weight loss as likely do benefit from dietician, food psychologist and consideration to medication.   - Adult Sleep Eval & Management  Referral; Future  -     Comprehensive Weight Management; Future  -     Adult Sleep Eval & Management  Referral; Future    Irregular periods   IUD (intrauterine device) in place - paragard  -     Periods normal first year after IUD the more irregular over past 5-6 months. Reviewed weight gain may have played a role in this, but will screen CBC and TSH as well.   - CBC with platelets; Future  -     TSH with free T4 reflex; Future  -     CBC with platelets  -     TSH with free T4 reflex    FRANK (generalized anxiety disorder)  Major depression in complete remission (H)  -     Feels mood is good on wellbutrin and residual PHQ scores related to weight gain, recent multiple life changes per HPI and trying to get her health back on track. Recheck 6 months on how things are going. Can do in office for weight check too.  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   - Hepatitis C Screen Reflex to HCV RNA Quant and Genotype; Future  -     Hepatitis C Screen Reflex to HCV RNA Quant and Genotype    Cervical cancer screening  -     Pap Screen with HPV - recommended age 30 - 65 years    Screening for diabetes mellitus  -     Comprehensive metabolic panel (BMP + Alb, Alk Phos, ALT, AST, Total. Bili, TP); Future  -     Comprehensive metabolic panel (BMP + Alb, Alk Phos, ALT, AST, Total. Bili,  "TP)      COUNSELING:  Reviewed preventive health counseling, as reflected in patient instructions       Regular exercise       Healthy diet/nutrition       Contraception       Safe sex practices/STD prevention       Consider Hep C screening for all patients one time for ages 18-79 years    Estimated body mass index is 49.55 kg/m  as calculated from the following:    Height as of this encounter: 1.695 m (5' 6.75\").    Weight as of this encounter: 142.4 kg (314 lb).    Weight management plan: Patient referred to endocrine and/or weight management specialty    She reports that she has never smoked. She has never used smokeless tobacco.      Counseling Resources:  ATP IV Guidelines  Pooled Cohorts Equation Calculator  Breast Cancer Risk Calculator  BRCA-Related Cancer Risk Assessment: FHS-7 Tool  FRAX Risk Assessment  ICSI Preventive Guidelines  Dietary Guidelines for Americans, 2010  USDA's MyPlate  ASA Prophylaxis  Lung CA Screening    Marquita Nelson PA-C  [facility]  Answers for HPI/ROS submitted by the patient on 5/24/2022  If you checked off any problems, how difficult have these problems made it for you to do your work, take care of things at home, or get along with other people?: Somewhat difficult  PHQ9 TOTAL SCORE: 9      "

## 2022-05-27 ENCOUNTER — MYC MEDICAL ADVICE (OUTPATIENT)
Dept: FAMILY MEDICINE | Facility: CLINIC | Age: 32
End: 2022-05-27
Payer: COMMERCIAL

## 2022-05-27 DIAGNOSIS — R79.89 ELEVATED TSH: Primary | ICD-10-CM

## 2022-05-27 LAB
ALBUMIN SERPL-MCNC: 4.1 G/DL (ref 3.4–5)
ALP SERPL-CCNC: 67 U/L (ref 40–150)
ALT SERPL W P-5'-P-CCNC: 33 U/L (ref 0–50)
ANION GAP SERPL CALCULATED.3IONS-SCNC: 8 MMOL/L (ref 3–14)
AST SERPL W P-5'-P-CCNC: 24 U/L (ref 0–45)
BILIRUB SERPL-MCNC: 0.4 MG/DL (ref 0.2–1.3)
BUN SERPL-MCNC: 9 MG/DL (ref 7–30)
CALCIUM SERPL-MCNC: 9.2 MG/DL (ref 8.5–10.1)
CHLORIDE BLD-SCNC: 105 MMOL/L (ref 94–109)
CO2 SERPL-SCNC: 24 MMOL/L (ref 20–32)
CREAT SERPL-MCNC: 0.75 MG/DL (ref 0.52–1.04)
GFR SERPL CREATININE-BSD FRML MDRD: >90 ML/MIN/1.73M2
GLUCOSE BLD-MCNC: 90 MG/DL (ref 70–99)
POTASSIUM BLD-SCNC: 4.2 MMOL/L (ref 3.4–5.3)
PROT SERPL-MCNC: 7.9 G/DL (ref 6.8–8.8)
SODIUM SERPL-SCNC: 137 MMOL/L (ref 133–144)
T4 FREE SERPL-MCNC: 1.06 NG/DL (ref 0.76–1.46)
TSH SERPL DL<=0.005 MIU/L-ACNC: 4.26 MU/L (ref 0.4–4)

## 2022-06-01 LAB
BKR LAB AP GYN ADEQUACY: NORMAL
BKR LAB AP GYN INTERPRETATION: NORMAL
BKR LAB AP HPV REFLEX: NORMAL
BKR LAB AP LMP: NORMAL
BKR LAB AP PREVIOUS ABNORMAL: NORMAL
PATH REPORT.COMMENTS IMP SPEC: NORMAL
PATH REPORT.COMMENTS IMP SPEC: NORMAL
PATH REPORT.RELEVANT HX SPEC: NORMAL

## 2022-06-03 LAB
HUMAN PAPILLOMA VIRUS 16 DNA: NEGATIVE
HUMAN PAPILLOMA VIRUS 18 DNA: NEGATIVE
HUMAN PAPILLOMA VIRUS FINAL DIAGNOSIS: NORMAL
HUMAN PAPILLOMA VIRUS OTHER HR: NEGATIVE

## 2022-06-03 NOTE — RESULT ENCOUNTER NOTE
Result(s) was/were reviewed with pt via [employer], see enc 5/27.  Electronically Signed By: Marquita Nelson PA-C

## 2022-07-08 ASSESSMENT — SLEEP AND FATIGUE QUESTIONNAIRES
HOW LIKELY ARE YOU TO NOD OFF OR FALL ASLEEP WHILE SITTING AND READING: MODERATE CHANCE OF DOZING
HOW LIKELY ARE YOU TO NOD OFF OR FALL ASLEEP WHILE SITTING INACTIVE IN A PUBLIC PLACE: SLIGHT CHANCE OF DOZING
HOW LIKELY ARE YOU TO NOD OFF OR FALL ASLEEP IN A CAR, WHILE STOPPED FOR A FEW MINUTES IN TRAFFIC: WOULD NEVER DOZE
HOW LIKELY ARE YOU TO NOD OFF OR FALL ASLEEP WHILE LYING DOWN TO REST IN THE AFTERNOON WHEN CIRCUMSTANCES PERMIT: HIGH CHANCE OF DOZING
HOW LIKELY ARE YOU TO NOD OFF OR FALL ASLEEP WHEN YOU ARE A PASSENGER IN A CAR FOR AN HOUR WITHOUT A BREAK: MODERATE CHANCE OF DOZING
HOW LIKELY ARE YOU TO NOD OFF OR FALL ASLEEP WHILE WATCHING TV: MODERATE CHANCE OF DOZING
HOW LIKELY ARE YOU TO NOD OFF OR FALL ASLEEP WHILE SITTING QUIETLY AFTER LUNCH WITHOUT ALCOHOL: MODERATE CHANCE OF DOZING
HOW LIKELY ARE YOU TO NOD OFF OR FALL ASLEEP WHILE SITTING AND TALKING TO SOMEONE: WOULD NEVER DOZE

## 2022-07-12 ENCOUNTER — LAB (OUTPATIENT)
Dept: LAB | Facility: CLINIC | Age: 32
End: 2022-07-12
Payer: COMMERCIAL

## 2022-07-12 DIAGNOSIS — R79.89 ELEVATED TSH: ICD-10-CM

## 2022-07-12 LAB — TSH SERPL DL<=0.005 MIU/L-ACNC: 2.32 MU/L (ref 0.4–4)

## 2022-07-12 PROCEDURE — 84443 ASSAY THYROID STIM HORMONE: CPT

## 2022-07-12 PROCEDURE — 36415 COLL VENOUS BLD VENIPUNCTURE: CPT

## 2022-07-13 ENCOUNTER — OFFICE VISIT (OUTPATIENT)
Dept: FAMILY MEDICINE | Facility: CLINIC | Age: 32
End: 2022-07-13
Payer: COMMERCIAL

## 2022-07-13 VITALS
HEIGHT: 67 IN | SYSTOLIC BLOOD PRESSURE: 120 MMHG | HEART RATE: 90 BPM | TEMPERATURE: 98.4 F | WEIGHT: 293 LBS | BODY MASS INDEX: 45.99 KG/M2 | DIASTOLIC BLOOD PRESSURE: 72 MMHG | OXYGEN SATURATION: 96 %
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
## 2022-07-13 DIAGNOSIS — M25.512 ACUTE PAIN OF LEFT SHOULDER: ICD-10-CM

## 2022-07-13 DIAGNOSIS — M79.621 PAIN OF RIGHT UPPER ARM: ICD-10-CM

## 2022-07-13 DIAGNOSIS — M54.6 ACUTE MIDLINE THORACIC BACK PAIN: ICD-10-CM

## 2022-07-13 PROCEDURE — 99213 OFFICE O/P EST LOW 20 MIN: CPT | Performed by: PHYSICIAN ASSISTANT

## 2022-07-13 RX ORDER — OMEGA-3 FATTY ACIDS/FISH OIL 300-1000MG
800 CAPSULE ORAL
Qty: 20 CAPSULE | Refills: 0
Start: 2022-07-13 | End: 2022-07-18

## 2022-07-13 RX ORDER — CYCLOBENZAPRINE HCL 10 MG
5-10 TABLET ORAL 3 TIMES DAILY PRN
Qty: 20 TABLET | Refills: 0 | Status: SHIPPED | OUTPATIENT
Start: 2022-07-13 | End: 2022-09-19
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
## 2022-07-13 NOTE — LETTER
[address] [address]  [phone]       July 13, 2022    Nicole Sinclair  [address]  [place] [address]    To Whom it May Concern:    The above patient is unable to attend work for 7/13/2022-7/15/2022 due to a medical issue. Please contact me with questions or concerns.      Sincerely,    Alana Reeder MBA, MS, PA-C  [facility]

## 2022-07-16 NOTE — RESULT ENCOUNTER NOTE
Dear Nicole,      Your recent test results are noted below:    -TSH (thyroid stimulating hormone) level is normal which indicates normal thyroid function.    For additional lab test information, [email or web address] is an excellent reference. Please contact the clinic at [phone] with any further questions or concerns.    Sincerely,      Marquita Nelson PA-C  [facility]

## 2022-09-19 ENCOUNTER — VIRTUAL VISIT (OUTPATIENT)
Dept: FAMILY MEDICINE | Facility: CLINIC | Age: 32
End: 2022-09-19
Payer: COMMERCIAL

## 2022-09-19 DIAGNOSIS — E66.01 OBESITY, MORBID (H): Primary | ICD-10-CM

## 2022-09-19 PROCEDURE — 99213 OFFICE O/P EST LOW 20 MIN: CPT | Mod: 95 | Performed by: PHYSICIAN ASSISTANT

## 2022-09-19 RX ORDER — PHENTERMINE HYDROCHLORIDE 37.5 MG/1
37.5 TABLET ORAL
Qty: 90 TABLET | Refills: 0 | Status: SHIPPED | OUTPATIENT
Start: 2022-09-19 | End: 2022-11-17

## 2022-09-19 ASSESSMENT — PATIENT HEALTH QUESTIONNAIRE - PHQ9
SUM OF ALL RESPONSES TO PHQ QUESTIONS 1-9: 7
10. IF YOU CHECKED OFF ANY PROBLEMS, HOW DIFFICULT HAVE THESE PROBLEMS MADE IT FOR YOU TO DO YOUR WORK, TAKE CARE OF THINGS AT HOME, OR GET ALONG WITH OTHER PEOPLE: SOMEWHAT DIFFICULT
SUM OF ALL RESPONSES TO PHQ QUESTIONS 1-9: 7
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
## 2022-09-20 ENCOUNTER — MYC MEDICAL ADVICE (OUTPATIENT)
Dept: FAMILY MEDICINE | Facility: CLINIC | Age: 32
End: 2022-09-20

## 2022-11-07 ASSESSMENT — SLEEP AND FATIGUE QUESTIONNAIRES
HOW LIKELY ARE YOU TO NOD OFF OR FALL ASLEEP WHEN YOU ARE A PASSENGER IN A CAR FOR AN HOUR WITHOUT A BREAK: MODERATE CHANCE OF DOZING
HOW LIKELY ARE YOU TO NOD OFF OR FALL ASLEEP WHILE SITTING AND TALKING TO SOMEONE: WOULD NEVER DOZE
HOW LIKELY ARE YOU TO NOD OFF OR FALL ASLEEP WHILE LYING DOWN TO REST IN THE AFTERNOON WHEN CIRCUMSTANCES PERMIT: HIGH CHANCE OF DOZING
HOW LIKELY ARE YOU TO NOD OFF OR FALL ASLEEP WHILE WATCHING TV: MODERATE CHANCE OF DOZING
HOW LIKELY ARE YOU TO NOD OFF OR FALL ASLEEP IN A CAR, WHILE STOPPED FOR A FEW MINUTES IN TRAFFIC: WOULD NEVER DOZE
HOW LIKELY ARE YOU TO NOD OFF OR FALL ASLEEP WHILE SITTING AND READING: MODERATE CHANCE OF DOZING
HOW LIKELY ARE YOU TO NOD OFF OR FALL ASLEEP WHILE SITTING QUIETLY AFTER LUNCH WITHOUT ALCOHOL: MODERATE CHANCE OF DOZING
HOW LIKELY ARE YOU TO NOD OFF OR FALL ASLEEP WHILE SITTING INACTIVE IN A PUBLIC PLACE: SLIGHT CHANCE OF DOZING

## 2022-11-09 ENCOUNTER — OFFICE VISIT (OUTPATIENT)
Dept: SLEEP MEDICINE | Facility: CLINIC | Age: 32
End: 2022-11-09
Attending: PHYSICIAN ASSISTANT
Payer: COMMERCIAL

## 2022-11-09 VITALS
HEIGHT: 67 IN | OXYGEN SATURATION: 94 % | HEART RATE: 106 BPM | SYSTOLIC BLOOD PRESSURE: 138 MMHG | BODY MASS INDEX: 45.99 KG/M2 | DIASTOLIC BLOOD PRESSURE: 84 MMHG | WEIGHT: 293 LBS

## 2022-11-09 DIAGNOSIS — R06.81 WITNESSED APNEIC SPELLS: Primary | ICD-10-CM

## 2022-11-09 DIAGNOSIS — R06.83 SNORING: ICD-10-CM

## 2022-11-09 DIAGNOSIS — F32.A ANXIETY AND DEPRESSION: ICD-10-CM

## 2022-11-09 DIAGNOSIS — G47.19 EXCESSIVE DAYTIME SLEEPINESS: ICD-10-CM

## 2022-11-09 DIAGNOSIS — E66.01 MORBID OBESITY (H): ICD-10-CM

## 2022-11-09 DIAGNOSIS — F41.9 ANXIETY AND DEPRESSION: ICD-10-CM

## 2022-11-09 PROCEDURE — 99204 OFFICE O/P NEW MOD 45 MIN: CPT | Performed by: PHYSICIAN ASSISTANT

## 2022-11-09 NOTE — PATIENT INSTRUCTIONS
"      MY TREATMENT INFORMATION FOR SLEEP APNEA-  Nicole Sinclair    Frequently asked questions:  1. What is Obstructive Sleep Apnea (SANNA)? SANNA is the most common type of sleep apnea. Apnea means, \"without breath.\"  Apnea is most often caused by narrowing or collapse of the upper airway as muscles relax during sleep.   Almost everyone has occasional apneas. Most people with sleep apnea have had brief interruptions at night frequently for many years.  The severity of sleep apnea is related to how frequent and severe the events are.   2. What are the consequences of SANNA? Symptoms include: feeling sleepy during the day, snoring loudly, gasping or stopping of breathing, trouble sleeping, and occasionally morning headaches or heartburn at night.  Sleepiness can be serious and even increase the risk of falling asleep while driving. Other health consequences may include development of high blood pressure and other cardiovascular disease in persons who are susceptible. Untreated SANNA  can contribute to heart disease, stroke and diabetes.   3. What are the treatment options? In most situations, sleep apnea is a lifelong disease that must be managed with daily therapy. Medications are not effective for sleep apnea and surgery is generally not considered until other therapies have been tried. Your treatment is your choice . Continuous Positive Airway (CPAP) works right away and is the therapy that is effective in nearly everyone. An oral device to hold your jaw forward is usually the next most reliable option. Other options include postioning devices (to keep you off your back), weight loss, and surgery including a tongue pacing device. There is more detail about some of these options below.  4. Are my sleep studies covered by insurance? Although we will request verification of coverage, we advise you also check in advance of the study to ensure there is coverage.    Important tips for those choosing CPAP and similar devices "   Know your equipment:  CPAP is continuous positive airway pressure that prevents obstructive sleep apnea by keeping the throat from collapsing while you are sleeping. In most cases, the device is  smart  and can slowly self-adjusts if your throat collapses and keeps a record every day of how well you are treated-this information is available to you and your care team.  BPAP is bilevel positive airway pressure that keeps your throat open and also assists each breath with a pressure boost to maintain adequate breathing.  Special kinds of BPAP are used in patients who have inadequate breathing from lung or heart disease. In most cases, the device is  smart  and can slowly self-adjusts to assist breathing. Like CPAP, the device keeps a record of how well you are treated.  Your mask is your connection to the device. You get to choose what feels most comfortable and the staff will help to make sure if fits. Here: are some examples of the different masks that are available:       Key points to remember on your journey with sleep apnea:  Sleep study.  PAP devices often need to be adjusted during a sleep study to show that they are effective and adjusted right.  Good tips to remember: Try wearing just the mask during a quiet time during the day so your body adapts to wearing it. A humidifier is recommended for comfort in most cases to prevent drying of your nose and throat. Allergy medication from your provider may help you if you are having nasal congestion.  Getting settled-in. It takes more than one night for most of us to get used to wearing a mask. Try wearing just the mask during a quiet time during the day so your body adapts to wearing it. A humidifier is recommended for comfort in most cases. Our team will work with you carefully on the first day and will be in contact within 4 days and again at 2 and 4 weeks for advice and remote device adjustments. Your therapy is evaluated by the device each day.   Use it every  night. The more you are able to sleep naturally for 7-8 hours, the more likely you will have good sleep and to prevent health risks or symptoms from sleep apnea. Even if you use it 4 hours it helps. Occasionally all of us are unable to use a medical therapy, in sleep apnea, it is not dangerous to miss one night.   Communicate. Call our skilled team on the number provided on the first day if your visit for problems that make it difficult to wear the device. Over 2 out of 3 patients can learn to wear the device long-term with help from our team. Remember to call our team or your sleep providers if you are unable to wear the device as we may have other solutions for those who cannot adapt to mask CPAP therapy. It is recommended that you sleep your sleep provider within the first 3 months and yearly after that if you are not having problems.   Use it for your health. We encourage use of CPAP masks during daytime quiet periods to allow your face and brain to adapt to the sensation of CPAP so that it will be a more natural sensation to awaken to at night or during naps. This can be very useful during the first few weeks or months of adapting to CPAP though it does not help medically to wear CPAP during wakefulness and  should not be used as a strategy just to meet guidelines.  Take care of your equipment. Make sure you clean your mask and tubing using directions every day and that your filter and mask are replaced as recommended or if they are not working.     BESIDES CPAP, WHAT OTHER THERAPIES ARE THERE?    Positioning Device  Positioning devices are generally used when sleep apnea is mild and only occurs on your back.This example shows a pillow that straps around the waist. It may be appropriate for those whose sleep study shows milder sleep apnea that occurs primarily when lying flat on one's back. Preliminary studies have shown benefit but effectiveness at home may need to be verified by a home sleep test. These  devices are generally not covered by medical insurance.  Examples of devices that maintain sleeping on the back to prevent snoring and mild sleep apnea.    Belt type body positioner  http://Shop2.com/    Electronic reminder  http://nightshifttherapy.com/            Oral Appliance  What is oral appliance therapy?  An oral appliance device fits on your teeth at night like a retainer used after having braces. The device is made by a specialized dentist and requires several visits over 1-2 months before a manufactured device is made to fit your teeth and is adjusted to prevent your sleep apnea. Once an oral device is working properly, snoring should be improved. A home sleep test may be recommended at that time if to determine whether the sleep apnea is adequately treated.       Some things to remember:  -Oral devices are often, but not always, covered by your medical insurance. Be sure to check with your insurance provider.   -If you are referred for oral therapy, you will be given a list of specialized dentists to consider or you may choose to visit the Web site of the American Academy of Dental Sleep Medicine  -Oral devices are less likely to work if you have severe sleep apnea or are extremely overweight.     More detailed information  An oral appliance is a small acrylic device that fits over the upper and lower teeth  (similar to a retainer or a mouth guard). This device slightly moves jaw forward, which moves the base of the tongue forward, opens the airway, improves breathing for effective treat snoring and obstructive sleep apnea in perhaps 7 out of 10 people .  The best working devices are custom-made by a dental device  after a mold is made of the teeth 1, 2, 3.  When is an oral appliance indicated?  Oral appliance therapy is recommended as a first-line treatment for patients with primary snoring, mild sleep apnea, and for patients with moderate sleep apnea who prefer appliance therapy to use  of CPAP4, 5. Severity of sleep apnea is determined by sleep testing and is based on the number of respiratory events per hour of sleep.   How successful is oral appliance therapy?  The success rate of oral appliance therapy in patients with mild sleep apnea is 75-80% while in patients with moderate sleep apnea it is 50-70%. The chance of success in patients with severe sleep apnea is 40-50%. The research also shows that oral appliances have a beneficial effect on the cardiovascular health of SANAN patients at the same magnitude as CPAP therapy7.  Oral appliances should be a second-line treatment in cases of severe sleep apnea, but if not completely successful then a combination therapy utilizing CPAP plus oral appliance therapy may be effective. Oral appliances tend to be effective in a broad range of patients although studies show that the patients who have the highest success are females, younger patients, those with milder disease, and less severe obesity. 3, 6.   Finding a dentist that practices dental sleep medicine  Specific training is available through the American Academy of Dental Sleep Medicine for dentists interested in working in the field of sleep. To find a dentist who is educated in the field of sleep and the use of oral appliances, near you, visit the Web site of the American Academy of Dental Sleep Medicine.    References  1. Gino, et al. Objectively measured vs self-reported compliance during oral appliance therapy for sleep-disordered breathing. Chest 2013; 144(5): 1587-8426.  2. Sonam et al. Objective measurement of compliance during oral appliance therapy for sleep-disordered breathing. Thorax 2013; 68(1): 91-96.  3. Diana et al. Mandibular advancement devices in 620 men and women with SANNA and snoring: tolerability and predictors of treatment success. Chest 2004; 125: 0493-2756.  4. Betina, et al. Oral appliances for snoring and SANNA: a review. Sleep 2006; 29: 244-262.  5.  Damian et al. Oral appliance treatment for SANNA: an update. J Clin Sleep Med 2014; 10(2): 215-227.  6. Fany et al. Predictors of OSAH treatment outcome. J Dent Res 2007; 86: 9713-3910.      Weight Loss:    Weight loss is a long-term strategy that may improve sleep apnea in some patients.    Weight management is a personal decision and the decision should be based on your interest and the potential benefits.  If you are interested in exploring weight loss strategies, the following discussion covers the impact on weight loss on sleep apnea and the approaches that may be successful.    Being overweight does not necessarily mean you will have health consequences.  Those who have BMI over 35 or over 27 with existing medical conditions carries greater risk.   Weight loss decreases severity of sleep apnea in most people with obesity. For those with mild obesity who have developed snoring with weight gain, even 15-30 pound weight loss can improve and occasionally eliminate sleep apnea.  Structured and life-long dietary and health habits are necessary to lose weight and keep healthier weight levels.     Though there may be significant health benefits from weight loss, long-term weight loss is very difficult to achieve- studies show success with dietary management in less than 10% of people. In addition, substantial weight loss may require years of dietary control and may be difficult if patients have severe obesity. In these cases, surgical management may be considered.  Finally, older individuals who have tolerated obesity without health complications may be less likely to benefit from weight loss strategies.      [unfilled]    Surgery:    Surgery for obstructive sleep apnea is considered generally only when other therapies fail to work. Surgery may be discussed with you if you are having a difficult time tolerating CPAP and or when there is an abnormal structure that requires surgical correction.  Nose and throat  surgeries often enlarge the airway to prevent collapse.  Most of these surgeries create pain for 1-2 weeks and up to half of the most common surgeries are not effective throughout life.  You should carefully discuss the benefits and drawbacks to surgery with your sleep provider and surgeon to determine if it is the best solution for you.   More information  Surgery for SANNA is directed at areas that are responsible for narrowing or complete obstruction of the airway during sleep.  There are a wide range of procedures available to enlarge and/or stabilize the airway to prevent blockage of breathing in the three major areas where it can occur: the palate, tongue, and nasal regions.  Successful surgical treatment depends on the accurate identification of the factors responsible for obstructive sleep apnea in each person.  A personalized approach is required because there is no single treatment that works well for everyone.  Because of anatomic variation, consultation with an examination by a sleep surgeon is a critical first step in determining what surgical options are best for each patient.  In some cases, examination during sedation may be recommended in order to guide the selection of procedures.  Patients will be counseled about risks and benefits as well as the typical recovery course after surgery. Surgery is typically not a cure for a person s SANNA.  However, surgery will often significantly improve one s SANNA severity (termed  success rate ).  Even in the absence of a cure, surgery will decrease the cardiovascular risk associated with OSA7; improve overall quality of life8 (sleepiness, functionality, sleep quality, etc).      Palate Procedures:  Patients with SANNA often have narrowing of their airway in the region of their tonsils and uvula.  The goals of palate procedures are to widen the airway in this region as well as to help the tissues resist collapse.  Modern palate procedure techniques focus on tissue  conservation and soft tissue rearrangement, rather than tissue removal.  Often the uvula is preserved in this procedure. Residual sleep apnea is common in patient after pharyngoplasty with an average reduction in sleep apnea events of 33%2.      Tongue Procedures:  ExamWhile patients are awake, the muscles that surround the throat are active and keep this region open for breathing. These muscles relax during sleep, allowing the tongue and other structures to collapse and block breathing.  There are several different tongue procedures available.  Selection of a tongue base procedure depends on characteristics seen on physical exam.  Generally, procedures are aimed at removing bulky tissues in this area or preventing the back of the tongue from falling back during sleep.  Success rates for tongue surgery range from 50-62%3.    Hypoglossal Nerve Stimulation:  Hypoglossal nerve stimulation has recently received approval from the [place] Food and Drug Administration for the treatment of obstructive sleep apnea.  This is based on research showing that the system was safe and effective in treating sleep apnea6.  Results showed that the median AHI score decreased 68%, from 29.3 to 9.0. This therapy uses an implant system that senses breathing patterns and delivers mild stimulation to airway muscles, which keeps the airway open during sleep.  The system consists of three fully implanted components: a small generator (similar in size to a pacemaker), a breathing sensor, and a stimulation lead.  Using a small handheld remote, a patient turns the therapy on before bed and off upon awakening.    Candidates for this device must be greater than 18 years of age, have moderate to severe SANNA (AHI between 15-65), BMI less than 35, have tried CPAP/oral appliance for at least 8 weeks without success, and have appropriate upper airway anatomy (determined by a sleep endoscopy performed by Dr. Jax Vergara).    Hypoglossal Nerve  Stimulation Pathway:    The sleep surgeon s office will work with the patient through the insurance prior-authorization process (including communications and appeals).    Nasal Procedures:  Nasal obstruction can interfere with nasal breathing during the day and night.  Studies have shown that relief of nasal obstruction can improve the ability of some patients to tolerate positive airway pressure therapy for obstructive sleep apnea1.  Treatment options include medications such as nasal saline, topical corticosteroid and antihistamine sprays, and oral medications such as antihistamines or decongestants. Non-surgical treatments can include external nasal dilators for selected patients. If these are not successful by themselves, surgery can improve the nasal airway either alone or in combination with these other options.      Combination Procedures:  Combination of surgical procedures and other treatments may be recommended, particularly if patients have more than one area of narrowing or persistent positional disease.  The success rate of combination surgery ranges from 66-80%2,3.    References  Gurjit CHERRY. The Role of the Nose in Snoring and Obstructive Sleep Apnoea: An Update.  Eur Arch Otorhinolaryngol. 2011; 268: 1365-73.   Praveen SM; Abhay JA; Jaswant JR; Pallanch JF; Angely MB; Monica SG; Michael PUGH. Surgical modifications of the upper airway for obstructive sleep apnea in adults: a systematic review and meta-analysis. SLEEP 2010;33(10):0448-8376. Hayden ZAMORA. Hypopharyngeal surgery in obstructive sleep apnea: an evidence-based medicine review.  Arch Otolaryngol Head Neck Surg. 2006 Feb;132(2):206-13.  Norberto YH1, Chaim Y, Geoffrey PRAKASH. The efficacy of anatomically based multilevel surgery for obstructive sleep apnea. Otolaryngol Head Neck Surg. 2003 Oct;129(4):327-35.  Kezirian E, Goldberg A. Hypopharyngeal Surgery in Obstructive Sleep Apnea: An Evidence-Based Medicine Review. Arch Otolaryngol Head Neck Surg. 2006  Feb;132(2):206-13.  Padmini PJ et al. Upper-Airway Stimulation for Obstructive Sleep Apnea.  N Engl J Med. 2014 Jan 9;370(2):139-49.  Ginger Y et al. Increased Incidence of Cardiovascular Disease in Middle-aged Men with Obstructive Sleep Apnea. Am J Respir Crit Care Med; 2002 166: 159-165  Hensley EM et al. Studying Life Effects and Effectiveness of Palatopharyngoplasty (SLEEP) study: Subjective Outcomes of Isolated Uvulopalatopharyngoplasty. Otolaryngol Head Neck Surg. 2011; 144: 623-631.        WHAT IF I ONLY HAVE SNORING?    Mandibular advancement devices, lateral sleep positioning, long-term weight loss and treatment of nasal allergies have been shown to improve snoring.  Exercising tongue muscles with a game (Lucidworksttps://Bunkr.Gazelle/us/benedicto/soundly-reduce-snoring/wp8996945954) or stimulating the tongue during the day with a device (https://doi.org/10.3390/azk76616306) have improved snoring in some individuals.    Remember to Drive Safe... Drive Alive     Sleep health profoundly affects your health, mood, and your safety.  Thirty three percent of the population (one in three of us) is not getting enough sleep and many have a sleep disorder. Not getting enough sleep or having an untreated / undertreated sleep condition may make us sleepy without even knowing it. In fact, our driving could be dramatically impaired due to our sleep health. As your provider, here are some things I would like you to know about driving:     Here are some warning signs for impairment and dangerous drowsy driving:              -Having been awake more than 16 hours               -Looking tired               -Eyelid drooping              -Head nodding (it could be too late at this point)              -Driving for more than 30 minutes     Some things you could do to make the driving safer if you are experiencing some drowsiness:              -Stop driving and rest              -Call for transportation              -Make sure your sleep  disorder is adequately treated     Some things that have been shown NOT to work when experiencing drowsiness while driving:              -Turning on the radio              -Opening windows              -Eating any  distracting  /  entertaining  foods (e.g., sunflower seeds, candy, or any other)              -Talking on the phone      Your decision may not only impact your life, but also the life of others. Please, remember to drive safe for yourself and all of us.

## 2022-11-09 NOTE — PROGRESS NOTES
Outpatient Sleep Medicine Consultation:      Name: Nicole Sinclair MRN# [id number]   Age: 32 year old YOB: 1990     Date of Consultation: November 9, 2022  Consultation is requested by: Marquita Nelson PA-C  [address] [place] [address] Marquita Ruiz*  Primary care provider: Marquita Nelson       Reason for Sleep Consult:     Nicole Sinclair is sent by Marquita Ruiz* for a sleep consultation regarding snoring.    Patient s Reason for visit  Nicole Sinclair main reason for visit: suspected sleep apnea  Patient states problem(s) started: summer 2021  Nicole Sinclair's goals for this visit: sleep better, snore less         Assessment and Plan:     Summary Sleep Diagnoses:  1. Witnessed apneic spells  2. Snoring  3.Excessive daytime sleepiness    Comorbid Diagnoses:  4. Morbid obesity (H)  5. Anxiety and depression    Patient is being evaluated for Obstructive Sleep Apnea (SANNA).  Patient's risk factors for SANNA include: snoring, excessive daytime sleepiness (ESS 12), witnessed apneas, enlarged neck girth (41 cm), obesity (BMI 50.18), and family history. We discussed pathophysiology of SANNA and consequences of untreated sleep apnea. Patient is interested in treatment and willing to undergo overnight sleep testing. Discussed testing with overnight attended polysomnography versus home sleep apnea testing. In lab PSG ordered with transcutaneous carbon dioxide monitoring to evaluate for possible coexistent hypoventilation given BMI >45.   - Comprehensive Sleep Study; Future    Briefly discussed potential treatment modalities in the event sleep apnea is identified on testing and additional information given in patient instructions. Will plan to discuss in more detail at next visit pending study results but patient is open to CPAP if recommended, though admits more interested in oral appliance.    Congratulated her on recent weight loss and encouraged her  to continue on weight loss journey as SANNA severity often dependent on weight.      Follow up 1-2 weeks following her study for review of results and to expedite care. Educational materials provided in instructions.         History of Present Illness:     Nicole Sinclair is a 32 year old female with obesity, anxiety, depression who presents to clinic today for evaluation of snoring and concern for sleep apnea. Patient reports symptoms of SANNA progressed over past 1-2 years with ~ 50lb weight gain. Has lost 12lb over past few weeks and notes improvement in symptoms and sleep quality, though snoring and witnessed apneas still present.     SLEEP-WAKE SCHEDULE:     Work/School Days: Patient goes to school/work: Yes - nurse, care coordination   Usually gets into bed at 10 -11:00PM  Takes patient Few mins to fall asleep  Has trouble falling asleep 0 nights per week  Wakes up in the middle of the night 1 times.  Wakes up due to Snorting self awake;Use the bathroom  She has trouble falling back asleep 0 times a week.   It usually takes Few mins to get back to sleep  Patient is usually up at 6-7:00AM  Uses alarm: Yes    Weekends/Non-work Days/All Other Days:  Usually gets into bed at 10-11:00PM  Takes patient about Few mins to fall asleep  Patient is usually up at 7-8:00AM  Uses alarm: No    Sleep Need  Patient estimates she gets 6-7hrs sleep on average   Patient thinks she needs about 8-9hrs sleep    Nicole Sinclair prefers to sleep in this position(s): Back;Side; head elevated  Patient states they do the following activities in bed: Read;Use phone, computer, or tablet    Naps  Patient takes a purposeful nap 1 time a week and naps are usually 1-2 hours in duration  She feels better after a nap: Yes  She dozes off unintentionally 1-2 days per week  Patient has had a driving accident or near-miss due to sleepiness/drowsiness: No  She had a total [place] Sleepiness Scale of 12 (11/07/22 2468), with scores of 10 or higher  "indicating abnormal levels of sleepiness.    SLEEP DISRUPTIONS:    Breathing/Snoring  Patient snores:Yes  Other people complain about her snoring: Yes  Patient has been told she stops breathing in her sleep:Yes   Rare gasp/choking arousals   \"not recently but in the past\" has struggled with: Morning headaches; Heartburn or reflux at night;Getting up to urinate more than once; stuffy nose in AM    Movement:  Patient gets pain, discomfort, with an urge to move:  No  It happens when she is resting:   N/A  It happens more at night:   N/A  Improved with movement: N/A  Patient has been told she kicks her legs at night:  No     Behaviors in Sleep:  Nicole Sinclair has experienced the following behaviors while sleeping: Sleep-talking;Sleepwalking;Waking up paralyzed    Sleep walking in childhood, last >10 years ago    Sleep talking almost nightly    Sleep paralysis twice in lifetime   She has experienced sudden muscle weakness during the day: No      CAFFEINE AND OTHER SUBSTANCES:    Patient consumes caffeinated beverages per day:  1  Last caffeine use is usually: 3PM  List of any prescribed or over the counter stimulants that patient takes: Was on Adipex, stopped about a week ago because caused mood changes (anger) and increased BP  List of any prescribed or over the counter sleep medication patient takes:  None  List of previous sleep medications that patient has tried: Doxylamine Succinate, herbal, melatonin  Patient drinks alcohol to help them sleep: No  Patient drinks alcohol near bedtime: No    Family History:  Patient has a family member been diagnosed with a sleep disorder: Yes - SANNA in mother and maternal grandmother. Mom has UPPP, no CPAP. Grandmother with CPAP.       SCALES:    EPWORTH SLEEPINESS SCALE      Ranson Sleepiness Scale ( MEGAN Willoughby  [birth year][facility] [place]/English - Final version - 21 Nov 07 - [facility] [place].) 11/7/2022   Sitting and reading Moderate chance of dozing   Watching TV Moderate chance " of dozing   Sitting, inactive in a public place (e.g. a theatre or a meeting) Slight chance of dozing   As a passenger in a car for an hour without a break Moderate chance of dozing   Lying down to rest in the afternoon when circumstances permit High chance of dozing   Sitting and talking to someone Would never doze   Sitting quietly after a lunch without alcohol Moderate chance of dozing   In a car, while stopped for a few minutes in traffic Would never doze   Moulton Score (MC) 12   Moulton Score (Sleep) 12         INSOMNIA SEVERITY INDEX (STEVENSON)      Insomnia Severity Index (STEVENSON) 11/7/2022   Difficulty falling asleep 0   Difficulty staying asleep 1   Problems waking up too early 1   How SATISFIED/DISSATISFIED are you with your CURRENT sleep pattern? 3   How NOTICEABLE to others do you think your sleep problem is in terms of impairing the quality of your life? 2   How WORRIED/DISTRESSED are you about your current sleep problem? 3   To what extent do you consider your sleep problem to INTERFERE with your daily functioning (e.g. daytime fatigue, mood, ability to function at work/daily chores, concentration, memory, mood, etc.) CURRENTLY? 3   STEVENSON Total Score 13       Guidelines for Scoring/Interpretation:  Total score categories:  0-7 = No clinically significant insomnia   8-14 = Subthreshold insomnia   15-21 = Clinical insomnia (moderate severity)  22-28 = Clinical insomnia (severe)  Used via courtesy of www.Activism.comealth.va.gov with permission from Lizandro Wolff PhD., [facility]    Allergies:    No Known Allergies    Medications:    Current Outpatient Medications   Medication Sig Dispense Refill     buPROPion (WELLBUTRIN XL) 300 MG 24 hr tablet Take 1 tablet (300 mg) by mouth every morning 90 tablet 3     Cholecalciferol (VITAMIN D PO) Take 2,000 Units by mouth daily.       LORazepam (ATIVAN) 1 MG tablet Take 1 tablet (1 mg) by mouth every 8 hours as needed for anxiety 20 tablet 0     paragard intrauterine copper  device 1 each by Intrauterine route once       phentermine (ADIPEX-P) 37.5 MG tablet Take 1 tablet (37.5 mg) by mouth every morning (before breakfast) (Patient not taking: Reported on 11/9/2022) 90 tablet 0       Problem List:  Patient Active Problem List    Diagnosis Date Noted     IUD (intrauterine device) in place - paragard placed 12/20/2020 05/26/2022     Priority: Medium     Major depression in complete remission (H) 05/26/2022     Priority: Medium     Segmental dysfunction of cervical region 02/18/2020     Priority: Medium     Segmental dysfunction of thoracic region 02/18/2020     Priority: Medium     Tension headache 02/18/2020     Priority: Medium     Mechanical back pain 02/18/2020     Priority: Medium     Major depression in complete remission (H) 05/30/2018     Priority: Medium     Irregular periods  05/30/2018     Priority: Medium     Hypertriglyceridemia 07/17/2017     Priority: Medium     Obesity, morbid (H)      Priority: Medium     Generalized anxiety disorder 07/26/2012     Priority: Medium     Diagnosis updated by automated process. Provider to review and confirm.       CARDIOVASCULAR SCREENING; LDL GOAL LESS THAN 160 10/31/2010     Priority: Medium        Past Medical/Surgical History:  Past Medical History:   Diagnosis Date     Depression with anxiety      Depressive disorder March 2009     Mild major depression (H) 4/16/2009     Obesity, morbid (H)      Past Surgical History:   Procedure Laterality Date     wisdom teeth  2008       Social History:  Social History     Socioeconomic History     Marital status: Single     Spouse name: Not on file     Number of children: 0     Years of education: Not on file     Highest education level: Not on file   Occupational History     Employer: STUDENT   Tobacco Use     Smoking status: Never     Smokeless tobacco: Never   Vaping Use     Vaping Use: Never used   Substance and Sexual Activity     Alcohol use: Yes     Comment: Special occasions only     Drug  use: No     Sexual activity: Yes     Partners: Male     Birth control/protection: Pull-out method, Condom, Natural Family Planning     Comment: Interested in copper IUD   Other Topics Concern      Service Not Asked     Blood Transfusions No     Caffeine Concern No     Occupational Exposure Not Asked     Hobby Hazards Not Asked     Sleep Concern Not Asked     Stress Concern Not Asked     Weight Concern No     Special Diet Not Asked     Back Care Not Asked     Exercise Not Asked     Bike Helmet Not Asked     Seat Belt Not Asked     Self-Exams Yes     Parent/sibling w/ CABG, MI or angioplasty before 65F 55M? No   Social History Narrative    Works as a nurse at [employer]     [employer]     Financial Resource Strain: Not on file   Food Insecurity: Not on file   Transportation Needs: Not on file   Physical Activity: Not on file   Stress: Not on file   Social Connections: Not on file   Intimate Partner Violence: Not on file   Housing Stability: Not on file       Family History:  Family History   Problem Relation Age of Onset     Gastrointestinal Disease Mother         born  ulcerative colitis      Hypertension Mother         with kidney disease     Diabetes Mother      Genitourinary Problems Mother         kidney transplant -?BP versus vioxx     Depression Mother      Obesity Mother      Sleep Apnea Mother      Lipids Father      Hyperlipidemia Father      Family History Negative Sister      No Known Problems Brother      Hypertension Maternal Grandmother      Depression Maternal Grandmother      Sleep Apnea Maternal Grandmother      Family History Negative Maternal Grandfather      Thyroid Disease Paternal Grandmother          78yo pulmonary fibrosis (smoker)     Heart Disease Paternal Grandfather          54yo MI after MVA     Coronary Artery Disease Paternal Grandfather      Colon Cancer Other         great maternal grandparent     Colon Cancer Maternal Uncle      Breast  "Cancer No family hx of        Review of Systems:  Positive for shortness of breath with activity, awakening with shortness of breath, heart racing at night, swelling in feet/legs, anxiety     Physical Examination:  Vitals: /84   Pulse 106   Ht 1.695 m (5' 6.75\")   Wt 144.2 kg (318 lb)   SpO2 94%   BMI 50.18 kg/m    BMI= Body mass index is 50.18 kg/m .  General appearance: Awake, alert, cooperative. Well groomed. Sitting comfortably in chair. In no apparent distress.  HEENT: Head: Normocephalic, atraumatic. Eyes:Conjunctiva clear. Sclera normal. Remainder of face covered by mask.   Neck: Neck Cir (cm): 41 cm (11/9/2022  7:39 AM)  Pulmonary:  Able to speak easily in full sentences. No cough or wheeze.   Skin:  No rashes or significant lesions on visible skin.   Neurologic: Alert, oriented x3.   Psychiatric: Mood euthymic. Affect congruent with full range and intensity.         Data: All pertinent previous laboratory data reviewed     Recent Labs   Lab Test 05/26/22  1002 04/26/19  0843    137   POTASSIUM 4.2 4.0   CHLORIDE 105 105   CO2 24 22   ANIONGAP 8 10   GLC 90 90   BUN 9 8   CR 0.75 0.70   LORI 9.2 9.1       Recent Labs   Lab Test 05/26/22  1002   WBC 8.7   RBC 4.59   HGB 13.1   HCT 40.5   MCV 88   MCH 28.5   MCHC 32.3   RDW 13.2          Recent Labs   Lab Test 05/26/22  1002   PROTTOTAL 7.9   ALBUMIN 4.1   BILITOTAL 0.4   ALKPHOS 67   AST 24   ALT 33       TSH (mU/L)   Date Value   07/12/2022 2.32   05/26/2022 4.26 (H)   07/13/2017 2.25   11/13/2015 3.49       No results found for: UAMP, UBARB, BENZODIAZEUR, UCANN, UCOC, OPIT, UPCP    No results found for: IRONSAT, PA40879, ROSENDA    No results found for: PH, PHARTERIAL, PO2, [id number], SAT, PCO2, HCO3, BASEEXCESS, AFIA, BEB    @LABRCNTIPR(phv:4,pco2v:4,po2v:4,hco3v:4,liam:4,o2per:4)@    Echocardiology: No results found for this or any previous visit (from the past 4320 hour(s)).    Chest x-ray: No results found for this or any previous " visit from the past 365 days.      Chest CT: No results found for this or any previous visit from the past 365 days.      PFT: Most Recent [facility] Pulmonary Function Testing    No results found for: 20001  No results found for: 20002  No results found for: 20003  No results found for: 20015  No results found for: 20016  No results found for: 20027  No results found for: 20028  No results found for: 20029  No results found for: 20079  No results found for: 20080  No results found for: 20081  No results found for: 20335  No results found for: 20105  No results found for: 20053  No results found for: 20054  No results found for: 20055      Briseyda Beck PA-C 11/9/2022     [facility]  [id number] [address]     [facility]  [address] [address]    Chart documentation was completed, in part, with [employer] voice-recognition software. Even though reviewed, some grammatical, spelling, and word errors may remain.    51 minutes spent on day of encounter reviewing medical records, history and physical examination, review of previous testing and interpretation, documentation and further activities as noted above

## 2022-11-09 NOTE — NURSING NOTE
"Chief Complaint   Patient presents with     Sleep Problem     Concerns of SANNA, snoring, tiredness during the day, snorting self awake        Initial /84   Pulse 106   Ht 1.695 m (5' 6.75\")   Wt 144.2 kg (318 lb)   SpO2 94%   BMI 50.18 kg/m   Estimated body mass index is 50.18 kg/m  as calculated from the following:    Height as of this encounter: 1.695 m (5' 6.75\").    Weight as of this encounter: 144.2 kg (318 lb).    Medication Reconciliation: complete  ESS 12  STEVENSON 13  Neck circumference: 16 inches / 41 centimeters.  Josiane Woo MA      "

## 2023-01-05 ENCOUNTER — OFFICE VISIT (OUTPATIENT)
Dept: PODIATRY | Facility: CLINIC | Age: 33
End: 2023-01-05
Payer: COMMERCIAL

## 2023-01-05 VITALS
WEIGHT: 293 LBS | BODY MASS INDEX: 47.09 KG/M2 | SYSTOLIC BLOOD PRESSURE: 128 MMHG | DIASTOLIC BLOOD PRESSURE: 80 MMHG | HEIGHT: 66 IN

## 2023-01-05 DIAGNOSIS — L60.0 INGROWN NAIL OF GREAT TOE OF RIGHT FOOT: ICD-10-CM

## 2023-01-05 DIAGNOSIS — M79.671 RIGHT FOOT PAIN: Primary | ICD-10-CM

## 2023-01-05 PROCEDURE — 99203 OFFICE O/P NEW LOW 30 MIN: CPT | Mod: 25 | Performed by: PODIATRIST

## 2023-01-05 PROCEDURE — 11750 EXCISION NAIL&NAIL MATRIX: CPT | Mod: T5 | Performed by: PODIATRIST

## 2023-01-05 RX ORDER — SILVER SULFADIAZINE 10 MG/G
CREAM TOPICAL DAILY
Qty: 25 G | Refills: 1 | Status: SHIPPED | OUTPATIENT
Start: 2023-01-05 | End: 2023-06-14

## 2023-01-05 NOTE — PATIENT INSTRUCTIONS
Thank you for choosing [facility]!    DR KLINE'S CLINIC:  [facility]   [address]   [place] [address]      TRIAGE LINE: [phone]  APPOINTMENTS: [phone]  RADIOLOGY: [phone]  SET UP SURGERY: [phone]  PHYSICAL THERAPY: [phone]   FAX NUMBER: [phone]  BILLING QUESTIONS: [phone]         Follow up: as needed    INGROWN TOENAILS  When a toenail is ingrown, it is curved and grows into the skin, usually at the nail borders (the sides of the nail). This  digging in  of the nail irritates the skin, often creating pain, redness, swelling, and warmth in the toe.  If an ingrown nail causes a break in the skin, bacteria may enter and cause an infection in the area, which is often marked by drainage and a foul odor. However, even if the toe isn t painful, red, swollen, or warm, a nail that curves downward into the skin can progress to an infection.  CAUSES:  Heredity: In many people, the tendency for ingrown toenails is inherited.   Trauma: Sometimes an ingrown toenail is the result of trauma, such as stubbing your toe, having an object fall on your toe, or engaging in activities that involve repeated pressure on the toes, such as kicking or running.   Improper Trimming:  The most common cause of ingrown toenails is cutting your nails too short. This encourages the skin next to the nail to fold over the nail.   Improperly Sized Footwear: Ingrown toenails can result from wearing socks and shoes that are tight or short.   Nail Conditions: Ingrown toenails can be caused by nail problems, such as fungal infections or losing a nail due to trauma.   TREATMENT: Sometimes initial treatment for ingrown toenails can be safely performed at home. However, home treatment is strongly discouraged if an infection is suspected, or for those who have medical conditions that put feet at high risk, such as diabetes, nerve damage in the foot, or poor  circulation.  Home care: If you don t have an infection or any of the above medical conditions, you can soak your foot in room-temperature water (adding Epsom s salt may be recommended by your doctor), and gently massage the side of the nail fold to help reduce the inflammation.  Avoid attempting  bathroom surgery.  Repeated cutting of the nail can cause the condition to worsen over time. If your symptoms fail to improve, it s time to see a foot and ankle surgeon.  Physician care: After examining the toe, the foot and ankle surgeon will select the treatment best suited for you. If an infection is present, an oral antibiotic may be prescribed.  Sometimes a minor surgical procedure, often performed in the office, will ease the pain and remove the offending nail. After applying a local anesthetic, the doctor removes part of the nail s side border. Some nails may become ingrown again, requiring removal of the nail root.  Following the nail procedure, a light bandage will be applied. Most people experience very little pain after surgery and may resume normal activity the next day. If your surgeon has prescribed an oral antibiotic, be sure to take all the medication, even if your symptoms have improved.  PREVENTION:  Proper Trimming: Cut toenails in a fairly straight line, and don t cut them too short. You should be able to get your fingernail under the sides and end of the nail.   Well-fitting Footwear: Don t wear shoes that are short or tight in the toe area. Avoid shoes that are loose, because they too cause pressure on the toes, especially when running or walking briskly.     INGROWN TOENAIL REMOVAL AFTERCARE   Go directly home and elevate the affected foot on one or two pillows for the remainder of the day/evening if possible. Your toe may stay numb anywhere from 2-8 hours.   Take Tylenol, ibuprofen or another anti-inflammatory as needed for pain.   Take antibiotic if that has been prescribed. Finish the entire  prescribed antibiotic even if your symptoms have improved.   The evening of the procedure, soak/wash the affected area in warm water (you may add Epsom salt) for 5 to 10 minutes. Do this twice a day for 2-4 weeks (6-8 weeks if you had phenol) (you may count showering/bathing as one soak).  After soaks, pat the area dry and then allow to airdry for a few minutes. Apply antibiotic ointment to the area and cover with 2 X 2 gauze and paper tape or band-aid.  You may pursue everyday activities as tolerated with either an open toe shoe or cut-out shoe as needed or you may wear regular shoes if no pain is noted.  Watch for any signs and symptoms of infection such as: redness, red streaks going up the foot/leg, swelling, pus or foul odor. Those that have had the phenol procedure, the toe will drain longer and will look like it is infected because it is a chemical burn.   Please call with questions.

## 2023-01-05 NOTE — LETTER
1/5/2023         RE: Nicole Sinclair  [address] [address]  [place] [address]        Dear Colleague,    Thank you for referring your patient, Nicole Sinclair, to the [facility]. Please see a copy of my visit note below.    PATIENT HISTORY:   Nicole Sinclair is a 32 year old female who presents to clinic for for painful right great toenail.  She states that its been bothering her for the last few months.  She tries to cut it back but it comes back painful shortly thereafter.  Denies injury.  Denies fever, nausea, vomiting.  Pain is 5 out of 10 at its worst.  Worse with pressure or tight shoes.  This morning about getting the sides permanently removed like she did with her left great toe a few years ago.    Review of Systems:  Patient denies fever, chills, rash, wound, stiffness, limping, numbness, weakness, heart burn, blood in stool, chest pain with activity, calf pain when walking, shortness of breath with activity, chronic cough, easy bleeding/bruising, swelling of ankles, excessive thirst, fatigue, depression, anxiety.       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     Obesity, morbid (H)         PAST SURGICAL HISTORY:   Past Surgical History:   Procedure Laterality Date     wisdom teeth  2008        MEDICATIONS:   Current Outpatient Medications:      buPROPion (WELLBUTRIN XL) 300 MG 24 hr tablet, Take 1 tablet (300 mg) by mouth every morning, Disp: 90 tablet, Rfl: 3     Cholecalciferol (VITAMIN D PO), Take 2,000 Units by mouth daily., Disp: , Rfl:      LORazepam (ATIVAN) 1 MG tablet, Take 1 tablet (1 mg) by mouth every 8 hours as needed for anxiety, Disp: 20 tablet, Rfl: 0     paragard intrauterine copper device, 1 each by Intrauterine route once, Disp: , Rfl:     Current Facility-Administered Medications:      paragard intrauterine copper IUD  device 1 each, 1 Device, Intrauterine, Once, Michaela Thompson MD     ALLERGIES:  No Known Allergies     SOCIAL HISTORY:   Social History     Socioeconomic History     Marital status: Single     Spouse name: Not on file     Number of children: 0     Years of education: Not on file     Highest education level: Not on file   Occupational History     Employer: STUDENT   Tobacco Use     Smoking status: Never     Smokeless tobacco: Never   Vaping Use     Vaping Use: Never used   Substance and Sexual Activity     Alcohol use: Yes     Comment: Special occasions only     Drug use: No     Sexual activity: Yes     Partners: Male     Birth control/protection: Pull-out method, Condom, Natural Family Planning     Comment: Interested in copper IUD   Other Topics Concern      Service Not Asked     Blood Transfusions No     Caffeine Concern No     Occupational Exposure Not Asked     Hobby Hazards Not Asked     Sleep Concern Not Asked     Stress Concern Not Asked     Weight Concern No     Special Diet Not Asked     Back Care Not Asked     Exercise Not Asked     Bike Helmet Not Asked     Seat Belt Not Asked     Self-Exams Yes     Parent/sibling w/ CABG, MI or angioplasty before 65F 55M? No   Social History Narrative    Works as a nurse at [employer]     [employer]     Financial Resource Strain: Not on file   Food Insecurity: Not on file   Transportation Needs: Not on file   Physical Activity: Not on file   Stress: Not on file   Social Connections: Not on file   Intimate Partner Violence: Not on file   Housing Stability: Not on file        FAMILY HISTORY:   Family History   Problem Relation Age of Onset     Gastrointestinal Disease Mother         born 1958 ulcerative colitis      Hypertension Mother         with kidney disease     Diabetes Mother      Genitourinary Problems Mother         kidney transplant -?BP versus vioxx     Depression Mother      Obesity Mother      Sleep Apnea Mother       "Lipids Father      Hyperlipidemia Father      Family History Negative Sister      No Known Problems Brother      Hypertension Maternal Grandmother      Depression Maternal Grandmother      Sleep Apnea Maternal Grandmother      Family History Negative Maternal Grandfather      Thyroid Disease Paternal Grandmother          78yo pulmonary fibrosis (smoker)     Heart Disease Paternal Grandfather          56yo MI after MVA     Coronary Artery Disease Paternal Grandfather      Colon Cancer Other         great maternal grandparent     Colon Cancer Maternal Uncle      Breast Cancer No family hx of         EXAM:Vitals: /80   Ht 1.676 m (5' 6\")   Wt 144.2 kg (318 lb)   BMI 51.33 kg/m    BMI= Body mass index is 51.33 kg/m .    General appearance: Patient is alert and fully cooperative with history & exam.  No sign of distress is noted during the visit.     Psychiatric: Affect is pleasant & appropriate.  Patient appears motivated to improve health.     Respiratory: Breathing is regular & unlabored while sitting.     HEENT: Hearing is intact to spoken word.  Speech is clear.  No gross evidence of visual impairment that would impact ambulation.     Dermatologic:  Medial and lateral borders of right great toenail is incurvated. Localized redness and pain on palpation.      Vascular: DP & PT pulses are intact & regular bilaterally.  No significant edema or varicosities noted.  CFT and skin temperature is normal to both lower extremities.     Neurologic: Lower extremity sensation is intact to light touch.  No evidence of weakness or contracture in the lower extremities.  No evidence of neuropathy.     Musculoskeletal: Patient is ambulatory without assistive device or brace.  No gross ankle deformity noted.  No foot or ankle joint effusion is noted.     ASSESSMENT:    Right foot pain  Ingrown nail of great toe of right foot     Medical Decision Making/Plan:  Review[facility] in [facility]. The potential " causes and nature of an ingrown toenail were discussed with the patient.  We reviewed the natural history/prognosis of the condition and potential risks if no treatment is provided.      Treatment options discussed included conservative management (oral antibiotics, soaking of foot, adequate width shoes)  as well as surgical management (partial or total nail removal).  The pros and cons of both forms of treatment were reviewed.      After thorough discussion and answering all questions, the patient elected to have the borders of the right great toenail removed.  She would like this removed permanently.  She will soak the foot twice a day for 6 weeks and apply Silvadene cream and a bandage.  All questions were answered to patient satisfaction and she will call for the questions or concerns.     Procedure: After verbal consent, the right big toe was anesthetized with 5cc's of 1% lidocaine plain. A tourniquet was applied to the toe. The medial and lateral borders were then raised from the nail bed and then cut the length of the nail.  The offending nail borders were then removed.  Three 30 second applications of phenol were applied to the nail bed and nail matrix.  The area was then flushed with copious amounts of alcohol.  Bacitracin was applied to the nail bed.  The tourniquet was removed.  Bandage was applied to the toe.  The patient tolerated the procedure and anesthesia well.        Patient risk factor: Patient is at low risk for infection.        Brittany Blanco DPM, Podiatry/Foot and Ankle Surgery        Again, thank you for allowing me to participate in the care of your patient.        Sincerely,        Brittany Blanco DPM, Podiatry/Foot and Ankle Surgery

## 2023-01-05 NOTE — PROGRESS NOTES
PATIENT HISTORY:   Nicole Sinclair is a 32 year old female who presents to clinic for for painful right great toenail.  She states that its been bothering her for the last few months.  She tries to cut it back but it comes back painful shortly thereafter.  Denies injury.  Denies fever, nausea, vomiting.  Pain is 5 out of 10 at its worst.  Worse with pressure or tight shoes.  This morning about getting the sides permanently removed like she did with her left great toe a few years ago.    Review of Systems:  Patient denies fever, chills, rash, wound, stiffness, limping, numbness, weakness, heart burn, blood in stool, chest pain with activity, calf pain when walking, shortness of breath with activity, chronic cough, easy bleeding/bruising, swelling of ankles, excessive thirst, fatigue, depression, anxiety.       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     Obesity, morbid (H)         PAST SURGICAL HISTORY:   Past Surgical History:   Procedure Laterality Date     wisdom teeth  2008        MEDICATIONS:   Current Outpatient Medications:      buPROPion (WELLBUTRIN XL) 300 MG 24 hr tablet, Take 1 tablet (300 mg) by mouth every morning, Disp: 90 tablet, Rfl: 3     Cholecalciferol (VITAMIN D PO), Take 2,000 Units by mouth daily., Disp: , Rfl:      LORazepam (ATIVAN) 1 MG tablet, Take 1 tablet (1 mg) by mouth every 8 hours as needed for anxiety, Disp: 20 tablet, Rfl: 0     paragard intrauterine copper device, 1 each by Intrauterine route once, Disp: , Rfl:     Current Facility-Administered Medications:      paragard intrauterine copper IUD device 1 each, 1 Device, Intrauterine, Once, Michaela Thompson MD     ALLERGIES:  No Known Allergies     SOCIAL HISTORY:   Social History     Socioeconomic History     Marital status: Single     Spouse name: Not on file     Number of children: 0     Years  of education: Not on file     Highest education level: Not on file   Occupational History     Employer: STUDENT   Tobacco Use     Smoking status: Never     Smokeless tobacco: Never   Vaping Use     Vaping Use: Never used   Substance and Sexual Activity     Alcohol use: Yes     Comment: Special occasions only     Drug use: No     Sexual activity: Yes     Partners: Male     Birth control/protection: Pull-out method, Condom, Natural Family Planning     Comment: Interested in copper IUD   Other Topics Concern      Service Not Asked     Blood Transfusions No     Caffeine Concern No     Occupational Exposure Not Asked     Hobby Hazards Not Asked     Sleep Concern Not Asked     Stress Concern Not Asked     Weight Concern No     Special Diet Not Asked     Back Care Not Asked     Exercise Not Asked     Bike Helmet Not Asked     Seat Belt Not Asked     Self-Exams Yes     Parent/sibling w/ CABG, MI or angioplasty before 65F 55M? No   Social History Narrative    Works as a nurse at [employer]     [employer]     Financial Resource Strain: Not on file   Food Insecurity: Not on file   Transportation Needs: Not on file   Physical Activity: Not on file   Stress: Not on file   Social Connections: Not on file   Intimate Partner Violence: Not on file   Housing Stability: Not on file        FAMILY HISTORY:   Family History   Problem Relation Age of Onset     Gastrointestinal Disease Mother         born 1958 ulcerative colitis      Hypertension Mother         with kidney disease     Diabetes Mother      Genitourinary Problems Mother         kidney transplant -?BP versus vioxx     Depression Mother      Obesity Mother      Sleep Apnea Mother      Lipids Father      Hyperlipidemia Father      Family History Negative Sister      No Known Problems Brother      Hypertension Maternal Grandmother      Depression Maternal Grandmother      Sleep Apnea Maternal Grandmother      Family History Negative Maternal Grandfather  "     Thyroid Disease Paternal Grandmother          78yo pulmonary fibrosis (smoker)     Heart Disease Paternal Grandfather          56yo MI after MVA     Coronary Artery Disease Paternal Grandfather      Colon Cancer Other         great maternal grandparent     Colon Cancer Maternal Uncle      Breast Cancer No family hx of         EXAM:Vitals: /80   Ht 1.676 m (5' 6\")   Wt 144.2 kg (318 lb)   BMI 51.33 kg/m    BMI= Body mass index is 51.33 kg/m .    General appearance: Patient is alert and fully cooperative with history & exam.  No sign of distress is noted during the visit.     Psychiatric: Affect is pleasant & appropriate.  Patient appears motivated to improve health.     Respiratory: Breathing is regular & unlabored while sitting.     HEENT: Hearing is intact to spoken word.  Speech is clear.  No gross evidence of visual impairment that would impact ambulation.     Dermatologic:  Medial and lateral borders of right great toenail is incurvated. Localized redness and pain on palpation.      Vascular: DP & PT pulses are intact & regular bilaterally.  No significant edema or varicosities noted.  CFT and skin temperature is normal to both lower extremities.     Neurologic: Lower extremity sensation is intact to light touch.  No evidence of weakness or contracture in the lower extremities.  No evidence of neuropathy.     Musculoskeletal: Patient is ambulatory without assistive device or brace.  No gross ankle deformity noted.  No foot or ankle joint effusion is noted.     ASSESSMENT:    Right foot pain  Ingrown nail of great toe of right foot     Medical Decision Making/Plan:  Reviewed patient's chart in [facility]. The potential causes and nature of an ingrown toenail were discussed with the patient.  We reviewed the natural history/prognosis of the condition and potential risks if no treatment is provided.      Treatment options discussed included conservative management (oral antibiotics, soaking " of foot, adequate width shoes)  as well as surgical management (partial or total nail removal).  The pros and cons of both forms of treatment were reviewed.      After thorough discussion and answering all questions, the patient elected to have the borders of the right great toenail removed.  She would like this removed permanently.  She will soak the foot twice a day for 6 weeks and apply Silvadene cream and a bandage.  All questions were answered to patient satisfaction and she will call for the questions or concerns.     Procedure: After verbal consent, the right big toe was anesthetized with 5cc's of 1% lidocaine plain. A tourniquet was applied to the toe. The medial and lateral borders were then raised from the nail bed and then cut the length of the nail.  The offending nail borders were then removed.  Three 30 second applications of phenol were applied to the nail bed and nail matrix.  The area was then flushed with copious amounts of alcohol.  Bacitracin was applied to the nail bed.  The tourniquet was removed.  Bandage was applied to the toe.  The patient tolerated the procedure and anesthesia well.        Patient risk factor: Patient is at low risk for infection.        Brittany Blanco DPM, Podiatry/Foot and Ankle Surgery

## 2023-02-09 ASSESSMENT — SLEEP AND FATIGUE QUESTIONNAIRES
HOW LIKELY ARE YOU TO NOD OFF OR FALL ASLEEP WHILE LYING DOWN TO REST IN THE AFTERNOON WHEN CIRCUMSTANCES PERMIT: HIGH CHANCE OF DOZING
HOW LIKELY ARE YOU TO NOD OFF OR FALL ASLEEP WHILE SITTING INACTIVE IN A PUBLIC PLACE: SLIGHT CHANCE OF DOZING
HOW LIKELY ARE YOU TO NOD OFF OR FALL ASLEEP WHILE SITTING AND READING: MODERATE CHANCE OF DOZING
HOW LIKELY ARE YOU TO NOD OFF OR FALL ASLEEP WHILE WATCHING TV: SLIGHT CHANCE OF DOZING
HOW LIKELY ARE YOU TO NOD OFF OR FALL ASLEEP WHEN YOU ARE A PASSENGER IN A CAR FOR AN HOUR WITHOUT A BREAK: MODERATE CHANCE OF DOZING
HOW LIKELY ARE YOU TO NOD OFF OR FALL ASLEEP WHILE SITTING QUIETLY AFTER LUNCH WITHOUT ALCOHOL: MODERATE CHANCE OF DOZING
HOW LIKELY ARE YOU TO NOD OFF OR FALL ASLEEP IN A CAR, WHILE STOPPED FOR A FEW MINUTES IN TRAFFIC: WOULD NEVER DOZE
HOW LIKELY ARE YOU TO NOD OFF OR FALL ASLEEP WHILE SITTING AND TALKING TO SOMEONE: WOULD NEVER DOZE

## 2023-02-15 ENCOUNTER — OFFICE VISIT (OUTPATIENT)
Dept: PODIATRY | Facility: CLINIC | Age: 33
End: 2023-02-15
Payer: COMMERCIAL

## 2023-02-15 VITALS — SYSTOLIC BLOOD PRESSURE: 126 MMHG | DIASTOLIC BLOOD PRESSURE: 82 MMHG | BODY MASS INDEX: 51.33 KG/M2 | WEIGHT: 293 LBS

## 2023-02-15 DIAGNOSIS — B35.1 ONYCHOMYCOSIS OF RIGHT GREAT TOE: Primary | ICD-10-CM

## 2023-02-15 PROCEDURE — 99213 OFFICE O/P EST LOW 20 MIN: CPT | Performed by: PODIATRIST

## 2023-02-15 RX ORDER — CICLOPIROX OLAMINE 7.7 MG/G
CREAM TOPICAL DAILY
Qty: 30 G | Refills: 6 | Status: SHIPPED | OUTPATIENT
Start: 2023-02-15 | End: 2023-11-03

## 2023-02-15 NOTE — LETTER
2/15/2023         RE: Nicole Sinclair  [address] [address]  [place] [address]        Dear Colleague,    Thank you for referring your patient, Nicole Sinclair, to the [facility]ODI[facility]. Please see a copy of my visit note below.    Podiatry / Foot and Ankle Surgery Progress Note    February 15, 2023    Subject: Patient was seen for possible irritation to her left great toe.  She had a nail procedure done about 6 weeks ago where phenol was used to burn out the side of the ingrown nail.  She states that there has been an overgrowth of tissue that sometimes will bleed.  2 days ago she can left it open to air and has not been covering it and notes that it is dried out and is now not painful.  Denies fever, nausea, vomiting.  Wondering if anything needs to be done for it at this time.    Objective:  Vitals: /82   Wt 144.2 kg (318 lb)   BMI 51.33 kg/m      General:  Patient is alert and orientated.  NAD.    Dermatologic:   Thickened hyperkeratotic tissue to the lateral aspect of the right great toenail.  No redness, purulent drainage or signs of acute infection noted.  Minimal discoloration to the left great toenail.      Vascular: DP & PT pulses are intact & regular bilaterally.  No significant edema or varicosities noted.  CFT and skin temperature is normal to both lower extremities.     Neurologic: Lower extremity sensation is intact to light touch.  No evidence of weakness or contracture in the lower extremities.  No evidence of neuropathy.     Musculoskeletal: Patient is ambulatory without assistive device or brace.  No gross ankle deformity noted.  No foot or ankle joint effusion is noted.    Assessment: Onychomycosis of right great toe      Medical Decision Making/Plan: At this time there does not appear to be hypergranulation tissue. At this time, would hold off on any removal of the tissue.  We will have her continue leave it open to air and she does not need to do  any further soaking.  Given the darkening of the nail we will order an antifungal cream that we will have her applied daily for the next 6 months as the new nail grows out to try to prevent nail fungus.  She will follow-up as needed.  All questions were answered to patient's satisfaction and she will call further questions or concerns.      Patient Risk Factor:  Patient is a low risk factor for infection.     Brittany Blanco DPM, Podiatry/Foot and Ankle Surgery        Again, thank you for allowing me to participate in the care of your patient.        Sincerely,        Brittany Blanco DPM, Podiatry/Foot and Ankle Surgery

## 2023-02-15 NOTE — PROGRESS NOTES
Podiatry / Foot and Ankle Surgery Progress Note    February 15, 2023    Subject: Patient was seen for possible irritation to her left great toe.  She had a nail procedure done about 6 weeks ago where phenol was used to burn out the side of the ingrown nail.  She states that there has been an overgrowth of tissue that sometimes will bleed.  2 days ago she can left it open to air and has not been covering it and notes that it is dried out and is now not painful.  Denies fever, nausea, vomiting.  Wondering if anything needs to be done for it at this time.    Objective:  Vitals: /82   Wt 144.2 kg (318 lb)   BMI 51.33 kg/m      General:  Patient is alert and orientated.  NAD.    Dermatologic:   Thickened hyperkeratotic tissue to the lateral aspect of the right great toenail.  No redness, purulent drainage or signs of acute infection noted.  Minimal discoloration to the left great toenail.      Vascular: DP & PT pulses are intact & regular bilaterally.  No significant edema or varicosities noted.  CFT and skin temperature is normal to both lower extremities.     Neurologic: Lower extremity sensation is intact to light touch.  No evidence of weakness or contracture in the lower extremities.  No evidence of neuropathy.     Musculoskeletal: Patient is ambulatory without assistive device or brace.  No gross ankle deformity noted.  No foot or ankle joint effusion is noted.    Assessment: Onychomycosis of right great toe      Medical Decision Making/Plan: At this time there does not appear to be hypergranulation tissue. At this time, would hold off on any removal of the tissue.  We will have her continue leave it open to air and she does not need to do any further soaking.  Given the darkening of the nail we will order an antifungal cream that we will have her applied daily for the next 6 months as the new nail grows out to try to prevent nail fungus.  She will follow-up as needed.  All questions were answered to  patient's satisfaction and she will call further questions or concerns.      Patient Risk Factor:  Patient is a low risk factor for infection.     Brittany Blanco DPM, Podiatry/Foot and Ankle Surgery

## 2023-02-16 ENCOUNTER — THERAPY VISIT (OUTPATIENT)
Dept: SLEEP MEDICINE | Facility: CLINIC | Age: 33
End: 2023-02-16
Payer: COMMERCIAL

## 2023-02-16 ENCOUNTER — DOCUMENTATION ONLY (OUTPATIENT)
Dept: SLEEP MEDICINE | Facility: CLINIC | Age: 33
End: 2023-02-16

## 2023-02-16 DIAGNOSIS — E66.01 MORBID OBESITY (H): ICD-10-CM

## 2023-02-16 DIAGNOSIS — G47.33 OSA (OBSTRUCTIVE SLEEP APNEA): Primary | ICD-10-CM

## 2023-02-16 DIAGNOSIS — G47.19 EXCESSIVE DAYTIME SLEEPINESS: ICD-10-CM

## 2023-02-16 DIAGNOSIS — F41.9 ANXIETY AND DEPRESSION: ICD-10-CM

## 2023-02-16 DIAGNOSIS — F32.A ANXIETY AND DEPRESSION: ICD-10-CM

## 2023-02-16 DIAGNOSIS — R06.81 WITNESSED APNEIC SPELLS: ICD-10-CM

## 2023-02-16 DIAGNOSIS — R06.83 SNORING: ICD-10-CM

## 2023-02-16 PROCEDURE — 95810 POLYSOM 6/> YRS 4/> PARAM: CPT | Mod: GC | Performed by: INTERNAL MEDICINE

## 2023-02-22 ASSESSMENT — SLEEP AND FATIGUE QUESTIONNAIRES
HOW LIKELY ARE YOU TO NOD OFF OR FALL ASLEEP WHILE SITTING AND TALKING TO SOMEONE: WOULD NEVER DOZE
HOW LIKELY ARE YOU TO NOD OFF OR FALL ASLEEP WHEN YOU ARE A PASSENGER IN A CAR FOR AN HOUR WITHOUT A BREAK: MODERATE CHANCE OF DOZING
HOW LIKELY ARE YOU TO NOD OFF OR FALL ASLEEP WHILE SITTING INACTIVE IN A PUBLIC PLACE: SLIGHT CHANCE OF DOZING
HOW LIKELY ARE YOU TO NOD OFF OR FALL ASLEEP WHILE SITTING AND READING: HIGH CHANCE OF DOZING
HOW LIKELY ARE YOU TO NOD OFF OR FALL ASLEEP WHILE LYING DOWN TO REST IN THE AFTERNOON WHEN CIRCUMSTANCES PERMIT: HIGH CHANCE OF DOZING
HOW LIKELY ARE YOU TO NOD OFF OR FALL ASLEEP WHILE WATCHING TV: MODERATE CHANCE OF DOZING
HOW LIKELY ARE YOU TO NOD OFF OR FALL ASLEEP IN A CAR, WHILE STOPPED FOR A FEW MINUTES IN TRAFFIC: WOULD NEVER DOZE
HOW LIKELY ARE YOU TO NOD OFF OR FALL ASLEEP WHILE SITTING QUIETLY AFTER LUNCH WITHOUT ALCOHOL: SLIGHT CHANCE OF DOZING

## 2023-02-22 NOTE — PROCEDURES
" SLEEP STUDY INTERPRETATION  DIAGNOSTIC POLYSOMNOGRAPHY REPORT      Patient: AMRIK CARRILLO  YOB: 1990  Study Date: 2/16/2023  MRN: [id number]  Referring Provider: -  Ordering Provider: TOSHA Grover Amber    Indications for Polysomnography: The patient is a 32-year-old Female who is 5' 7\" and weighs 318.0 lbs. Her BMI is 50.5, [place] sleepiness scale 12 and neck circumference is 41 cm. Relevant medical history includes obesity, anxiety and depression. A diagnostic polysomnogram was performed to evaluate for sleep apnea/hypoxemia /hypoventilation.    Polysomnogram Data: A full night polysomnogram recorded the standard physiologic parameters including EEG, EOG, EMG, ECG, nasal and oral airflow. Respiratory parameters of chest and abdominal movements were recorded with respiratory inductance plethysmography. Oxygen saturation was recorded by pulse oximetry. Hypopnea scoring rule used: 1B 4%.    Sleep Architecture: Severe sleep fragmentation was noted, mostly due to respiratory events. All stages of sleep were observed. Both sleep stages N3 and REM sleep were reduced.  The total recording time of the polysomnogram was 496.5 minutes. The total sleep time was 444.0 minutes. Sleep latency was reduced at 7.4 minutes without the use of a sleep aid. REM latency was 154.0 minutes. Arousal index was increased at 78.1 arousals per hour. Sleep efficiency was normal at 89.4%. Wake after sleep onset was 44.0 minutes. The patient spent 7.3% of total sleep time in Stage N1, 69.6% in Stage N2, 10.7% in Stage N3, and 12.4% in REM. Time in REM supine was 46.5 minutes.    Respiration: Severe obstructive sleep apnea was present with sleep associated hypoxemia, without hypoventilation.     Events ? The polysomnogram revealed a presence of 94 obstructive, 2 central, and 1 mixed apnea resulting in an apnea index of 13.1 events per hour. There were 588 obstructive hypopneas and - central hypopneas resulting in an obstructive " hypopnea index of 79.5 and central hypopnea index of - events per hour. The combined apnea/hypopnea index was 92.6 events per hour (central apnea/hypopnea index was 0.3 events per hour). The REM AHI was 89.5 events per hour. The supine AHI was 86.4 events per hour. The RERA index was 0.3 events per hour.  The RDI was 92.8 events per hour.    Snoring - was reported as loud.    Respiratory rate and pattern - was notable for normal respiratory rate and pattern.    Sustained Sleep Associated Hypoventilation - Transcutaneous carbon dioxide monitoring was used, however significant hypoventilation was not present with a maximum change from 38 to 44 mmHg and 0 minutes at or greater than 55 mmHg.    Sleep Associated Hypoxemia - (Greater than 5 minutes O2 sat at or below 88%) was present. Baseline oxygen saturation was 88.2%. Lowest oxygen saturation was 51.0%. Time spent less than or equal to 88% was 191.2 minutes. Time spent less than or equal to 89% was 211.1 minutes.    Movement Activity: There were no limb movements or abnormal sleep related behaviors.    Periodic Limb Activity - There were - PLMs during the entire study. The PLM index was - movements per hour. The PLM Arousal Index was - per hour.    REM EMG Activity - Excessive transient/sustained muscle activity was not present.    Nocturnal Behavior - Abnormal sleep related behaviors were not noted during/arising out of NREM / REM sleep.     Bruxism - Not apparent.    Cardiac Summary: Sinus rhythm was noted with intermittent sinus tachycardia  The average pulse rate was 91.7 bpm. The minimum pulse rate was 63.0 bpm while the maximum pulse rate was 113.0 bpm.  Intermittent sinus tachycardia was noted.    Assessment:     Severe sleep fragmentation was noted, mostly due to respiratory events. All stages of sleep were observed. Both sleep stages N3 and REM sleep were reduced.    Severe obstructive sleep apnea was present with sleep associated hypoxemia, without  hypoventilation.     There were no limb movements or abnormal sleep related behaviors.    Sinus rhythm was noted with intermittent sinus tachycardia.      Recommendations:    Recommend repeat polysomnography with full night titration of positive airway pressure therapy for the control of sleep disordered breathing.    Based on the presence of severe obstructive sleep apnea and excessive daytime sleepiness, treatment could be empirically initiated with Auto titrating CPAP therapy with a range of 5 to 15 cmH2O. Recommend clinical follow up with sleep management team. Once established on CPAP therapy consider obtaining overnight oximetry test with CPAP  to ensure correction of hypoxemia.     Advice regarding the risks of drowsy driving.    Suggest optimizing sleep schedule and avoiding sleep deprivation.    Weight management (if BMI > 30).    Diagnostic Codes:   Obstructive Sleep Apnea G47.33  Sleep Hypoxemia G47.36   Repetitive Intrusions Into Sleep F51.8    Julisa Singh MD  Sleep Medicine Fellow    2/16/2023 [facility] (318.0 lbs) - AHI 92.6, RDI 92.8, Supine AHI 86.4, REM AHI 89.5, Low O2 51.0%, Time Spent ?88% 191.2 minutes / Time Spent ?89% 211.1 minutes.    Attending MD: PSG was personally reviewed in detail with the Sleep Medicine Fellow.  The above interpretation reflects our joint assessment and recommendations.   _____________________________________   Electronically Signed By: (Nadia Pop MD), 2/22/23

## 2023-02-24 LAB — SLPCOMP: NORMAL

## 2023-03-01 ENCOUNTER — OFFICE VISIT (OUTPATIENT)
Dept: SLEEP MEDICINE | Facility: CLINIC | Age: 33
End: 2023-03-01
Payer: COMMERCIAL

## 2023-03-01 VITALS
SYSTOLIC BLOOD PRESSURE: 131 MMHG | HEIGHT: 66 IN | WEIGHT: 293 LBS | DIASTOLIC BLOOD PRESSURE: 86 MMHG | BODY MASS INDEX: 47.09 KG/M2 | OXYGEN SATURATION: 94 % | HEART RATE: 106 BPM

## 2023-03-01 DIAGNOSIS — G47.33 OSA (OBSTRUCTIVE SLEEP APNEA): Primary | ICD-10-CM

## 2023-03-01 DIAGNOSIS — G47.34 NOCTURNAL HYPOXEMIA: ICD-10-CM

## 2023-03-01 PROCEDURE — 99214 OFFICE O/P EST MOD 30 MIN: CPT | Performed by: PHYSICIAN ASSISTANT

## 2023-03-01 NOTE — PATIENT INSTRUCTIONS
" SLEEP STUDY INTERPRETATION  DIAGNOSTIC POLYSOMNOGRAPHY REPORT        Patient: AMRIK CARRILLO  YOB: 1990  Study Date: 2/16/2023  MRN: [id number]  Referring Provider: -  Ordering Provider: TOSHA Grover Amber     Indications for Polysomnography: The patient is a 32-year-old Female who is 5' 7\" and weighs 318.0 lbs. Her BMI is 50.5, [place] sleepiness scale 12 and neck circumference is 41 cm. Relevant medical history includes obesity, anxiety and depression. A diagnostic polysomnogram was performed to evaluate for sleep apnea/hypoxemia /hypoventilation.     Polysomnogram Data: A full night polysomnogram recorded the standard physiologic parameters including EEG, EOG, EMG, ECG, nasal and oral airflow. Respiratory parameters of chest and abdominal movements were recorded with respiratory inductance plethysmography. Oxygen saturation was recorded by pulse oximetry. Hypopnea scoring rule used: 1B 4%.     Sleep Architecture: Severe sleep fragmentation was noted, mostly due to respiratory events. All stages of sleep were observed. Both sleep stages N3 and REM sleep were reduced.  The total recording time of the polysomnogram was 496.5 minutes. The total sleep time was 444.0 minutes. Sleep latency was reduced at 7.4 minutes without the use of a sleep aid. REM latency was 154.0 minutes. Arousal index was increased at 78.1 arousals per hour. Sleep efficiency was normal at 89.4%. Wake after sleep onset was 44.0 minutes. The patient spent 7.3% of total sleep time in Stage N1, 69.6% in Stage N2, 10.7% in Stage N3, and 12.4% in REM. Time in REM supine was 46.5 minutes.     Respiration: Severe obstructive sleep apnea was present with sleep associated hypoxemia, without hypoventilation.   Events ? The polysomnogram revealed a presence of 94 obstructive, 2 central, and 1 mixed apnea resulting in an apnea index of 13.1 events per hour. There were 588 obstructive hypopneas and - central hypopneas resulting in an " obstructive hypopnea index of 79.5 and central hypopnea index of - events per hour. The combined apnea/hypopnea index was 92.6 events per hour (central apnea/hypopnea index was 0.3 events per hour). The REM AHI was 89.5 events per hour. The supine AHI was 86.4 events per hour. The RERA index was 0.3 events per hour.  The RDI was 92.8 events per hour.  Snoring - was reported as loud.  Respiratory rate and pattern - was notable for normal respiratory rate and pattern.  Sustained Sleep Associated Hypoventilation - Transcutaneous carbon dioxide monitoring was used, however significant hypoventilation was not present with a maximum change from 38 to 44 mmHg and 0 minutes at or greater than 55 mmHg.  Sleep Associated Hypoxemia - (Greater than 5 minutes O2 sat at or below 88%) was present. Baseline oxygen saturation was 88.2%. Lowest oxygen saturation was 51.0%. Time spent less than or equal to 88% was 191.2 minutes. Time spent less than or equal to 89% was 211.1 minutes.     Movement Activity: There were no limb movements or abnormal sleep related behaviors.  Periodic Limb Activity - There were - PLMs during the entire study. The PLM index was - movements per hour. The PLM Arousal Index was - per hour.  REM EMG Activity - Excessive transient/sustained muscle activity was not present.  Nocturnal Behavior - Abnormal sleep related behaviors were not noted during/arising out of NREM / REM sleep.   Bruxism - Not apparent.     Cardiac Summary: Sinus rhythm was noted with intermittent sinus tachycardia  The average pulse rate was 91.7 bpm. The minimum pulse rate was 63.0 bpm while the maximum pulse rate was 113.0 bpm.  Intermittent sinus tachycardia was noted.     Assessment:   Severe sleep fragmentation was noted, mostly due to respiratory events. All stages of sleep were observed. Both sleep stages N3 and REM sleep were reduced.  Severe obstructive sleep apnea was present with sleep associated hypoxemia, without  hypoventilation.   There were no limb movements or abnormal sleep related behaviors.  Sinus rhythm was noted with intermittent sinus tachycardia.        Recommendations:  Recommend repeat polysomnography with full night titration of positive airway pressure therapy for the control of sleep disordered breathing.  Based on the presence of severe obstructive sleep apnea and excessive daytime sleepiness, treatment could be empirically initiated with Auto titrating CPAP therapy with a range of 5 to 15 cmH2O. Recommend clinical follow up with sleep management team. Once established on CPAP therapy consider obtaining overnight oximetry test with CPAP  to ensure correction of hypoxemia.   Advice regarding the risks of drowsy driving.  Suggest optimizing sleep schedule and avoiding sleep deprivation.  Weight management (if BMI > 30).     Diagnostic Codes:   Obstructive Sleep Apnea G47.33  Sleep Hypoxemia G47.36   Repetitive Intrusions Into Sleep F51.8     Julisa Singh MD  Sleep Medicine Fellow     2/16/2023 [facility] Study (318.0 lbs) - AHI 92.6, RDI 92.8, Supine AHI 86.4, REM AHI 89.5, Low O2 51.0%, Time Spent ?88% 191.2 minutes / Time Spent ?89% 211.1 minutes.     Attending MD: PSG was personally reviewed in detail with the Sleep Medicine Fellow.  The above interpretation reflects our joint assessment and recommendations.   _____________________________________   Electronically Signed By: (Nadia Pop MD), 2/22/23

## 2023-03-01 NOTE — NURSING NOTE
"Chief Complaint   Patient presents with     Sleep Problem     Follow up sleep study results       Initial /86   Pulse 106   Ht 1.676 m (5' 6\")   Wt 144.2 kg (318 lb)   SpO2 94%   BMI 51.33 kg/m   Estimated body mass index is 51.33 kg/m  as calculated from the following:    Height as of this encounter: 1.676 m (5' 6\").    Weight as of this encounter: 144.2 kg (318 lb).    Medication Reconciliation: complete  ESS 12  STEVENSON 12  Josiane Woo MA      "

## 2023-03-01 NOTE — PROGRESS NOTES
[facility]   Outpatient Sleep Medicine  Mar 1, 2023       Name: Nicole Sinclair MRN# [id number]   Age: 32 year old YOB: 1990            Assessment and Plan:   1. SANNA (obstructive sleep apnea)  2. Nocturnal hypoxemia    During this visit, we reviewed the summary of the study including stage, position, event distribution, oximetry and heart rate. Severe obstructive sleep apnea was seen with associated hypoxemia, no hypoventilation - AHI 92.6, RDI 92.8, Supine AHI 86.4, REM AHI 89.5, Low O2 51.0%, Time Spent ?88% 191.2 minutes / Time Spent ?89% 211.1 minutes, TCM with max change 38 to 44 mmHg. Sleep architecture showed all stages of sleep present with normal sleep efficiency 89.4% but with fragmentation secondary to respiratory events.  No limb movements or abnormal sleep-related behaviors.    Dr. Mitchell placed orders for auto CPAP 5-15 cm H2O on 2/22/2023 to expedite PAP set up. Patient is scheduled for CPAP set up [facility] on 3/7/2023. We reviewed benefits of CPAP treatment and reviewed importance of nightly therapy for effective treatment of SANNA. We also reviewed importance of using PAP during the entire sleep duration to achieve maximal benefits, but reviewed that a minimum of 4 hours per night was required.  She is confident that she can achieve these goals and is willing to start therapy as soon as possible.    She will be seen back approximately 2 months after starting PAP therapy to ensure compliance and review treatment outcomes.  However, if she develops any difficulties with treatment she is instructed to contact us or DME company immediately to troubleshoot problems.         Chief Complaint      Chief Complaint   Patient presents with     Sleep Problem     Follow up sleep study results          History of Present Illness:   Nicole Sinclair is a 32 year old female who presents to the clinic for results of recent sleep study completed on 2/16/22. Study was  completed to evaluate for SANNA/hypoxemia /hypoventilation.    Reviewed results of sleep study with patient as follows:   SLEEP STUDY INTERPRETATION  DIAGNOSTIC POLYSOMNOGRAPHY REPORT  Sleep Architecture: Severe sleep fragmentation was noted, mostly due to respiratory events. All stages of sleep were observed. Both sleep stages N3 and REM sleep were reduced.  The total recording time of the polysomnogram was 496.5 minutes. The total sleep time was 444.0 minutes. Sleep latency was reduced at 7.4 minutes without the use of a sleep aid. REM latency was 154.0 minutes. Arousal index was increased at 78.1 arousals per hour. Sleep efficiency was normal at 89.4%. Wake after sleep onset was 44.0 minutes. The patient spent 7.3% of total sleep time in Stage N1, 69.6% in Stage N2, 10.7% in Stage N3, and 12.4% in REM. Time in REM supine was 46.5 minutes.     Respiration: Severe obstructive sleep apnea was present with sleep associated hypoxemia, without hypoventilation.     Events ? The polysomnogram revealed a presence of 94 obstructive, 2 central, and 1 mixed apnea resulting in an apnea index of 13.1 events per hour. There were 588 obstructive hypopneas and - central hypopneas resulting in an obstructive hypopnea index of 79.5 and central hypopnea index of - events per hour. The combined apnea/hypopnea index was 92.6 events per hour (central apnea/hypopnea index was 0.3 events per hour). The REM AHI was 89.5 events per hour. The supine AHI was 86.4 events per hour. The RERA index was 0.3 events per hour.  The RDI was 92.8 events per hour.    Snoring - was reported as loud.    Respiratory rate and pattern - was notable for normal respiratory rate and pattern.    Sustained Sleep Associated Hypoventilation - Transcutaneous carbon dioxide monitoring was used, however significant hypoventilation was not present with a maximum change from 38 to 44 mmHg and 0 minutes at or greater than 55 mmHg.    Sleep Associated Hypoxemia - (Greater  than 5 minutes O2 sat at or below 88%) was present. Baseline oxygen saturation was 88.2%. Lowest oxygen saturation was 51.0%. Time spent less than or equal to 88% was 191.2 minutes. Time spent less than or equal to 89% was 211.1 minutes.     Movement Activity: There were no limb movements or abnormal sleep related behaviors.    Periodic Limb Activity - There were - PLMs during the entire study. The PLM index was - movements per hour. The PLM Arousal Index was - per hour.    REM EMG Activity - Excessive transient/sustained muscle activity was not present.    Nocturnal Behavior - Abnormal sleep related behaviors were not noted during/arising out of NREM / REM sleep.     Bruxism - Not apparent.     Cardiac Summary: Sinus rhythm was noted with intermittent sinus tachycardia  The average pulse rate was 91.7 bpm. The minimum pulse rate was 63.0 bpm while the maximum pulse rate was 113.0 bpm.  Intermittent sinus tachycardia was noted.     Assessment:     Severe sleep fragmentation was noted, mostly due to respiratory events. All stages of sleep were observed. Both sleep stages N3 and REM sleep were reduced.    Severe obstructive sleep apnea was present with sleep associated hypoxemia, without hypoventilation.     There were no limb movements or abnormal sleep related behaviors.    Sinus rhythm was noted with intermittent sinus tachycardia.      Recommendations:    Recommend repeat polysomnography with full night titration of positive airway pressure therapy for the control of sleep disordered breathing.    Based on the presence of severe obstructive sleep apnea and excessive daytime sleepiness, treatment could be empirically initiated with Auto titrating CPAP therapy with a range of 5 to 15 cmH2O. Recommend clinical follow up with sleep management team. Once established on CPAP therapy consider obtaining overnight oximetry test with CPAP  to ensure correction of hypoxemia.     Advice regarding the risks of drowsy  "driving.    Suggest optimizing sleep schedule and avoiding sleep deprivation.    Weight management (if BMI > 30).     Past medical/surgical history, family history, social history, medications and allergies were reviewed.           Physical Examination:   /86   Pulse 106   Ht 1.676 m (5' 6\")   Wt 144.2 kg (318 lb)   SpO2 94%   BMI 51.33 kg/m    General appearance: Awake, alert, cooperative. Well groomed. Sitting comfortably in chair. In no apparent distress.  HEENT: Head: Normocephalic, atraumatic. Eyes:Conjunctiva clear. Sclera normal. Remainder of face covered by mask.  Pulmonary:  Able to speak easily in full sentences. No cough or wheeze.   Skin:  No rashes or significant lesions on visible skin.   Neurologic: Alert, oriented x3.   Psychiatric: Mood euthymic. Affect congruent with full range and intensity.      CC:  Marquita Nelson PA-C  Mar 1, 2023     [facility] [place]  [address] [place], [place] [id number] [place] [address] [facility] [place]  [address] [address]    Chart documentation was completed, in part, with [employer] mobile voice-recognition software. Even though reviewed, some grammatical, spelling, and word errors may remain.    38 minutes spent on day of encounter doing chart review, history and exam, documentation, and further activities as noted above      "

## 2023-03-07 ENCOUNTER — DOCUMENTATION ONLY (OUTPATIENT)
Dept: SLEEP MEDICINE | Facility: CLINIC | Age: 33
End: 2023-03-07
Payer: COMMERCIAL

## 2023-03-07 DIAGNOSIS — G47.33 OBSTRUCTIVE SLEEP APNEA (ADULT) (PEDIATRIC): Primary | ICD-10-CM

## 2023-03-07 NOTE — PROGRESS NOTES
Patient was offered choice of vendor and chose [facility].  Patient Nicole Sinclair was set up at [place] on March 7, 2023. Patient received a Resmed Airsense 11 Pressures were set at 5-15 cm H2O.   Patient s ramp is 5 cm H2O for Auto and FLEX/EPR is EPR, 2.  Patient received a Resmed Mask name: [id number]  Full Face mask size Medium, heated tubing and heated humidifier.  Patient does not need to meet compliance. Patient has a follow up on 05/26/23 with Briseyda Beck PA-C .    Danielle Ascencio

## 2023-03-10 ENCOUNTER — DOCUMENTATION ONLY (OUTPATIENT)
Dept: SLEEP MEDICINE | Facility: CLINIC | Age: 33
End: 2023-03-10
Payer: COMMERCIAL

## 2023-03-10 NOTE — PROGRESS NOTES
3 day Sleep therapy management telephone visit    Diagnostic AHI: 92.6  PSG    Confirmed with patient at time of call- N/A Patient is still interested in STM service       Message left for patient to return call    Order settings:  CPAP MIN CPAP MAX   5 cm H2O 15 cm H2O       Device settings:  CPAP MIN CPAP MAX EPR RESMED SOFT RESPONSE SETTING   5.0 cm  H20 15.0 cm  H20 TWO OFF       Compliance 100 %    Assessment: Nightly usage over four hours      Action plan: Patient to have 14 day STM visit. Patient has a follow up visit scheduled:   yes within 31-90 days of set up    Replacement device: No  STM ordered by provider: Yes     Total time spent on accessing and  interpreting remote patient PAP therapy data  10 minutes    Total time spent counseling, coaching  and reviewing PAP therapy data with patient  1 minutes    [id number] no

## 2023-03-13 ENCOUNTER — MYC MEDICAL ADVICE (OUTPATIENT)
Dept: SLEEP MEDICINE | Facility: CLINIC | Age: 33
End: 2023-03-13
Payer: COMMERCIAL

## 2023-03-13 DIAGNOSIS — G47.33 OBSTRUCTIVE SLEEP APNEA (ADULT) (PEDIATRIC): Primary | ICD-10-CM

## 2023-03-22 ENCOUNTER — DOCUMENTATION ONLY (OUTPATIENT)
Dept: SLEEP MEDICINE | Facility: CLINIC | Age: 33
End: 2023-03-22
Payer: COMMERCIAL

## 2023-03-22 NOTE — PROGRESS NOTES
14  DAY STM VISIT    Diagnostic AHI: 92.6  PSG    Message left for patient to return call     Assessment: Pt meeting objective benchmarks.      Action plan: waiting for patient to return call.  and pt to have 30 day STM visit.      Device type: Auto-CPAP    PAP settings:  CPAP MIN CPAP MAX 95TH % PRESSURE EPR RESMED SOFT RESPONSE SETTING   5.0 cm  H20 13.0 cm  H20 14.2 cm  H20  TWO OFF     Mask type:  Nasal Mask    Objective measures: 14 day rolling measures   COMPLIANCE LEAK AHI AVERAGE USE IN MINUTES   100 % 13.12 0.97 453   GOAL >70% GOAL < 24 LPM GOAL <5 GOAL >240          Total time spent on accessing and interpreting remote patient PAP therapy data  10 minutes    Total time spent counseling, coaching  and reviewing PAP therapy data with patient  1 minute    [id number]  [id number]  no (3 day STM)

## 2023-04-20 ENCOUNTER — TELEPHONE (OUTPATIENT)
Dept: SLEEP MEDICINE | Facility: CLINIC | Age: 33
End: 2023-04-20
Payer: COMMERCIAL

## 2023-04-20 DIAGNOSIS — G47.33 OBSTRUCTIVE SLEEP APNEA (ADULT) (PEDIATRIC): Primary | ICD-10-CM

## 2023-04-20 NOTE — TELEPHONE ENCOUNTER
Patient called [facility] for check in. She feels she is doing a lot better with CPAP. She recently adjusted APAP down to 5-11 cm H2O due to aerophagia but then increased it to 5-12 cm H2O for snoring. She still experiences some aerophagia. She was advised to contact her physicians office for pressure changes and voiced understanding. She had an AHI of 0 while she was on 5-11 cm H2O Oriana advised those settings She had questions about supplies and will contact [facility].

## 2023-06-09 ASSESSMENT — SLEEP AND FATIGUE QUESTIONNAIRES
HOW LIKELY ARE YOU TO NOD OFF OR FALL ASLEEP WHILE LYING DOWN TO REST IN THE AFTERNOON WHEN CIRCUMSTANCES PERMIT: SLIGHT CHANCE OF DOZING
HOW LIKELY ARE YOU TO NOD OFF OR FALL ASLEEP WHILE SITTING QUIETLY AFTER LUNCH WITHOUT ALCOHOL: WOULD NEVER DOZE
HOW LIKELY ARE YOU TO NOD OFF OR FALL ASLEEP WHILE SITTING AND TALKING TO SOMEONE: WOULD NEVER DOZE
HOW LIKELY ARE YOU TO NOD OFF OR FALL ASLEEP WHEN YOU ARE A PASSENGER IN A CAR FOR AN HOUR WITHOUT A BREAK: WOULD NEVER DOZE
HOW LIKELY ARE YOU TO NOD OFF OR FALL ASLEEP WHILE SITTING AND READING: WOULD NEVER DOZE
HOW LIKELY ARE YOU TO NOD OFF OR FALL ASLEEP IN A CAR, WHILE STOPPED FOR A FEW MINUTES IN TRAFFIC: WOULD NEVER DOZE
HOW LIKELY ARE YOU TO NOD OFF OR FALL ASLEEP WHILE SITTING INACTIVE IN A PUBLIC PLACE: WOULD NEVER DOZE
HOW LIKELY ARE YOU TO NOD OFF OR FALL ASLEEP WHILE WATCHING TV: WOULD NEVER DOZE

## 2023-06-14 ENCOUNTER — OFFICE VISIT (OUTPATIENT)
Dept: SLEEP MEDICINE | Facility: CLINIC | Age: 33
End: 2023-06-14
Payer: COMMERCIAL

## 2023-06-14 VITALS — DIASTOLIC BLOOD PRESSURE: 86 MMHG | SYSTOLIC BLOOD PRESSURE: 135 MMHG | HEART RATE: 102 BPM | OXYGEN SATURATION: 98 %

## 2023-06-14 DIAGNOSIS — G47.34 NOCTURNAL HYPOXEMIA: ICD-10-CM

## 2023-06-14 DIAGNOSIS — G47.33 OSA (OBSTRUCTIVE SLEEP APNEA): Primary | ICD-10-CM

## 2023-06-14 PROCEDURE — 99215 OFFICE O/P EST HI 40 MIN: CPT | Performed by: PHYSICIAN ASSISTANT

## 2023-06-14 NOTE — PROGRESS NOTES
[facility]   Outpatient Sleep Medicine  Jun 14, 2023       Name: Nicole Sinclair MRN# [id number]   Age: 33 year old YOB: 1990            Assessment and Plan:   1. SANNA (obstructive sleep apnea)  Patient's sleep apnea is adequately managed and well treated with current PAP settings [id number] with low residual AHI of 0.5 events per hour. Compliance is excellent (reports 100% usage but ResMed was down and didn't record few days earlier this month).  She is tolerating PAP well. Daytime symptoms and nighttime sleep quality are significantly improved since starting on therapy. STEVENSON and ESS now in normal range. No indication to adjust settings today. Prescription updated for mask/supplies. Will continue nightly therapy.    - Comprehensive DME    2. Nocturnal hypoxemia  Discussed the option of pursuing follow-up oxygen monitoring on CPAP therapy to verify correction of hypoxemia.  Patient politely declined at this time and given how well she is doing and resolution of sleep disordered breathing on download would very much assume her hypoxemia has resolved as well.  Could always consider this in the future pending progress.    Nicole Sinclair will follow up in about 1 year for annual CPAP visit, of course sooner as needed.       Chief Complaint      Chief Complaint   Patient presents with     Sleep Problem     CPAP follow up          History of Present Illness:     Nicole Sinclair is a 33 year old female with obesity, anxiety, depression who presents to the clinic for follow-up of their severe obstructive sleep apnea treated with CPAP.     Originally diagnosed via PSG 2/16/23 (318#, BMI 50.5) - Severe obstructive sleep apnea was seen with associated hypoxemia, no hypoventilation - AHI 92.6, RDI 92.8, Supine AHI 86.4, REM AHI 89.5, Low O2 51.0%, Time Spent ?88% 191.2 minutes / Time Spent ?89% 211.1 minutes, TCM with max change 38 to 44 mmHg. Sleep architecture showed all stages of sleep  "present with normal sleep efficiency 89.4% but with fragmentation secondary to respiratory events.  No limb movements or abnormal sleep-related behaviors.    Set up with autoCPAP [id number] on 3/7/23 through [facility]. Returns today to discuss progress on PAP therapy.     Current pressures [id number], decreased by STM due to aerophagia. Doing very well with CPAP therapy. Overall, the patient rates their experience with PAP as 10 (0 poor, 10 great). States \"every part of my life is at a better place since I started the CPAP\". Improvements noted with CPAP include waking up more refreshed and feeling well rested in the morning, sleeping better with less arousals, nocturia improved, improved energy level during the day.    Patient is using a nasal pillow mask. The mask is comfortable. The mask is not leaking. They are not snoring with the mask on. They are not having gasp arousals.  They are not having significant oral/nasal dryness or epistaxis.  They are not having pain/skin breakdown. The pressure settings are comfortable.     Bedtime is typically 10:00PM. Usually it takes about 10 minutes to fall asleep with the mask on. Wake time is typically 6:15AM.  Patient is using PAP therapy 6-8 hours per night. The patient is usually getting 6-8 hours of sleep per night.     ResMed Auto-PAP 5-11 cmH2O download:  27 total days of use. 3 nonuse days. 27 days with >4 hours use.  Average use 6 hours 41 minutes per day. Median Leak 0 L/min. 95%ile Leak 1.0 L/min. CPAP 95% pressure 11.0cm. AHI 0.5    SCALES:   INSOMNIA: Insomnia Severity Score: 3 today, pre-CPAP 11/9/2022 was 13   SLEEPINESS: Liscomb Sleepiness Score: 1 today, pre-CPAP 11/9/2022 was 12    Past medical/surgical history, family history, social history, medications and allergies were reviewed.           Physical Examination:   /86   Pulse 102   SpO2 98%   General appearance: Awake, alert, cooperative. Well groomed. Sitting comfortably in chair. In no apparent " distress.  HEENT: Head: Normocephalic, atraumatic. Eyes:Conjunctiva clear. Sclera normal. Nose: External appearance without deformity.   Pulmonary:  Able to speak easily in full sentences. No cough or wheeze.   Skin:  No rashes or significant lesions on visible skin.   Neurologic: Alert, oriented x3.   Psychiatric: Mood euthymic. Affect congruent with full range and intensity.      CC:  Marquita Nelson PA-C  Jun 14, 2023     [facility]  [address] [place] [address]     [facility]  [address] [address]    Chart documentation was completed, in part, with Robert Applebaum MD voice-recognition software. Even though reviewed, some grammatical, spelling, and word errors may remain.    45 minutes spent on day of encounter doing chart review, history and exam, documentation, and further activities as noted above

## 2023-06-26 ENCOUNTER — MYC REFILL (OUTPATIENT)
Dept: FAMILY MEDICINE | Facility: CLINIC | Age: 33
End: 2023-06-26
Payer: COMMERCIAL

## 2023-06-26 DIAGNOSIS — F33.42 RECURRENT MAJOR DEPRESSIVE DISORDER, IN FULL REMISSION (H): ICD-10-CM

## 2023-06-26 DIAGNOSIS — F41.1 GAD (GENERALIZED ANXIETY DISORDER): ICD-10-CM

## 2023-06-26 RX ORDER — BUPROPION HYDROCHLORIDE 300 MG/1
300 TABLET ORAL EVERY MORNING
Qty: 90 TABLET | Refills: 0 | Status: CANCELLED | OUTPATIENT
Start: 2023-06-26

## 2023-07-02 ENCOUNTER — HEALTH MAINTENANCE LETTER (OUTPATIENT)
Age: 33
End: 2023-07-02

## 2023-08-01 ENCOUNTER — MYC MEDICAL ADVICE (OUTPATIENT)
Dept: FAMILY MEDICINE | Facility: CLINIC | Age: 33
End: 2023-08-01
Payer: COMMERCIAL

## 2023-08-02 NOTE — TELEPHONE ENCOUNTER
My chart message sent     Amita Livingston RN, BSN  [facility]

## 2023-09-15 DIAGNOSIS — F41.1 GAD (GENERALIZED ANXIETY DISORDER): ICD-10-CM

## 2023-09-15 DIAGNOSIS — F33.42 RECURRENT MAJOR DEPRESSIVE DISORDER, IN FULL REMISSION (H): ICD-10-CM

## 2023-09-15 RX ORDER — BUPROPION HYDROCHLORIDE 300 MG/1
300 TABLET ORAL EVERY MORNING
Qty: 90 TABLET | Refills: 0 | Status: SHIPPED | OUTPATIENT
Start: 2023-09-15 | End: 2023-11-03

## 2023-09-15 NOTE — TELEPHONE ENCOUNTER
Placed call to patient to help schedule an appointment. Patient had a physical scheduled today with Marquita Nelson, that needed to be r/s due to provider only being VV. Physical r/s to 11/3/23.

## 2023-10-27 ASSESSMENT — ENCOUNTER SYMPTOMS
DYSURIA: 0
PARESTHESIAS: 0
SORE THROAT: 0
NAUSEA: 0
PALPITATIONS: 0
HEMATOCHEZIA: 0
FEVER: 0
ABDOMINAL PAIN: 0
HEARTBURN: 0
HEADACHES: 0
CHILLS: 0
SHORTNESS OF BREATH: 0
DIZZINESS: 0
COUGH: 0
WEAKNESS: 0
MYALGIAS: 0
CONSTIPATION: 0
BREAST MASS: 0
DIARRHEA: 0
ARTHRALGIAS: 0
NERVOUS/ANXIOUS: 0
JOINT SWELLING: 0
EYE PAIN: 0
FREQUENCY: 0
HEMATURIA: 0

## 2023-11-03 ENCOUNTER — OFFICE VISIT (OUTPATIENT)
Dept: FAMILY MEDICINE | Facility: CLINIC | Age: 33
End: 2023-11-03
Payer: COMMERCIAL

## 2023-11-03 VITALS
HEIGHT: 66 IN | OXYGEN SATURATION: 99 % | TEMPERATURE: 98.9 F | RESPIRATION RATE: 16 BRPM | HEART RATE: 114 BPM | WEIGHT: 293 LBS | DIASTOLIC BLOOD PRESSURE: 76 MMHG | BODY MASS INDEX: 47.09 KG/M2 | SYSTOLIC BLOOD PRESSURE: 116 MMHG

## 2023-11-03 DIAGNOSIS — Z97.5 BREAKTHROUGH BLEEDING ON NEXPLANON: ICD-10-CM

## 2023-11-03 DIAGNOSIS — F33.42 RECURRENT MAJOR DEPRESSIVE DISORDER, IN FULL REMISSION (H): ICD-10-CM

## 2023-11-03 DIAGNOSIS — Z00.00 ROUTINE GENERAL MEDICAL EXAMINATION AT A HEALTH CARE FACILITY: Primary | ICD-10-CM

## 2023-11-03 DIAGNOSIS — E66.01 OBESITY, MORBID (H): ICD-10-CM

## 2023-11-03 DIAGNOSIS — R20.2 TINGLING: ICD-10-CM

## 2023-11-03 DIAGNOSIS — G47.33 OSA (OBSTRUCTIVE SLEEP APNEA): ICD-10-CM

## 2023-11-03 DIAGNOSIS — Z97.5 NEXPLANON IN PLACE: ICD-10-CM

## 2023-11-03 DIAGNOSIS — F41.1 GAD (GENERALIZED ANXIETY DISORDER): ICD-10-CM

## 2023-11-03 DIAGNOSIS — Z23 NEED FOR TETANUS BOOSTER: ICD-10-CM

## 2023-11-03 DIAGNOSIS — Z13.220 LIPID SCREENING: ICD-10-CM

## 2023-11-03 DIAGNOSIS — R73.03 PREDIABETES: ICD-10-CM

## 2023-11-03 DIAGNOSIS — Z78.9 HISTORY OF CLENCHING OF MANDIBLE: ICD-10-CM

## 2023-11-03 DIAGNOSIS — Z13.1 SCREENING FOR DIABETES MELLITUS: ICD-10-CM

## 2023-11-03 DIAGNOSIS — N92.1 BREAKTHROUGH BLEEDING ON NEXPLANON: ICD-10-CM

## 2023-11-03 LAB
ALBUMIN SERPL BCG-MCNC: 4.5 G/DL (ref 3.5–5.2)
ALP SERPL-CCNC: 56 U/L (ref 35–104)
ALT SERPL W P-5'-P-CCNC: 29 U/L (ref 0–50)
ANION GAP SERPL CALCULATED.3IONS-SCNC: 11 MMOL/L (ref 7–15)
AST SERPL W P-5'-P-CCNC: 26 U/L (ref 0–45)
BILIRUB SERPL-MCNC: 0.4 MG/DL
BUN SERPL-MCNC: 7.9 MG/DL (ref 6–20)
CALCIUM SERPL-MCNC: 9.6 MG/DL (ref 8.6–10)
CHLORIDE SERPL-SCNC: 106 MMOL/L (ref 98–107)
CHOLEST SERPL-MCNC: 166 MG/DL
CREAT SERPL-MCNC: 0.74 MG/DL (ref 0.51–0.95)
DEPRECATED HCO3 PLAS-SCNC: 22 MMOL/L (ref 22–29)
EGFRCR SERPLBLD CKD-EPI 2021: >90 ML/MIN/1.73M2
ERYTHROCYTE [DISTWIDTH] IN BLOOD BY AUTOMATED COUNT: 13.1 % (ref 10–15)
GLUCOSE SERPL-MCNC: 121 MG/DL (ref 70–99)
HBA1C MFR BLD: 6.3 % (ref 0–5.6)
HCT VFR BLD AUTO: 38 % (ref 35–47)
HDLC SERPL-MCNC: 27 MG/DL
HGB BLD-MCNC: 12.2 G/DL (ref 11.7–15.7)
LDLC SERPL CALC-MCNC: 100 MG/DL
MCH RBC QN AUTO: 28.5 PG (ref 26.5–33)
MCHC RBC AUTO-ENTMCNC: 32.1 G/DL (ref 31.5–36.5)
MCV RBC AUTO: 89 FL (ref 78–100)
NONHDLC SERPL-MCNC: 139 MG/DL
PLATELET # BLD AUTO: 246 10E3/UL (ref 150–450)
POTASSIUM SERPL-SCNC: 4.7 MMOL/L (ref 3.4–5.3)
PROT SERPL-MCNC: 7.8 G/DL (ref 6.4–8.3)
RBC # BLD AUTO: 4.28 10E6/UL (ref 3.8–5.2)
SODIUM SERPL-SCNC: 139 MMOL/L (ref 135–145)
TRIGL SERPL-MCNC: 196 MG/DL
TSH SERPL DL<=0.005 MIU/L-ACNC: 2.6 UIU/ML (ref 0.3–4.2)
WBC # BLD AUTO: 7.1 10E3/UL (ref 4–11)

## 2023-11-03 PROCEDURE — 83036 HEMOGLOBIN GLYCOSYLATED A1C: CPT | Performed by: PHYSICIAN ASSISTANT

## 2023-11-03 PROCEDURE — 80053 COMPREHEN METABOLIC PANEL: CPT | Performed by: PHYSICIAN ASSISTANT

## 2023-11-03 PROCEDURE — 84443 ASSAY THYROID STIM HORMONE: CPT | Performed by: PHYSICIAN ASSISTANT

## 2023-11-03 PROCEDURE — 85027 COMPLETE CBC AUTOMATED: CPT | Performed by: PHYSICIAN ASSISTANT

## 2023-11-03 PROCEDURE — 90715 TDAP VACCINE 7 YRS/> IM: CPT | Performed by: PHYSICIAN ASSISTANT

## 2023-11-03 PROCEDURE — 99395 PREV VISIT EST AGE 18-39: CPT | Mod: 25 | Performed by: PHYSICIAN ASSISTANT

## 2023-11-03 PROCEDURE — 99214 OFFICE O/P EST MOD 30 MIN: CPT | Mod: 25 | Performed by: PHYSICIAN ASSISTANT

## 2023-11-03 PROCEDURE — 36415 COLL VENOUS BLD VENIPUNCTURE: CPT | Performed by: PHYSICIAN ASSISTANT

## 2023-11-03 PROCEDURE — 90471 IMMUNIZATION ADMIN: CPT | Performed by: PHYSICIAN ASSISTANT

## 2023-11-03 PROCEDURE — 80061 LIPID PANEL: CPT | Performed by: PHYSICIAN ASSISTANT

## 2023-11-03 RX ORDER — CYCLOBENZAPRINE HCL 10 MG
10 TABLET ORAL 3 TIMES DAILY PRN
Qty: 30 TABLET | Refills: 0 | Status: SHIPPED | OUTPATIENT
Start: 2023-11-03

## 2023-11-03 RX ORDER — CYCLOBENZAPRINE HCL 10 MG
10 TABLET ORAL 3 TIMES DAILY PRN
COMMUNITY
End: 2023-11-03

## 2023-11-03 RX ORDER — CALCIUM CARBONATE/VITAMIN D3 500 MG-10
TABLET,CHEWABLE ORAL
COMMUNITY

## 2023-11-03 RX ORDER — BUPROPION HYDROCHLORIDE 300 MG/1
300 TABLET ORAL EVERY MORNING
Qty: 90 TABLET | Refills: 3 | Status: SHIPPED | OUTPATIENT
Start: 2023-11-03

## 2023-11-03 ASSESSMENT — ENCOUNTER SYMPTOMS
ABDOMINAL PAIN: 0
FEVER: 0
CHILLS: 0
EYE PAIN: 0
JOINT SWELLING: 0
DIARRHEA: 0
DYSURIA: 0
PALPITATIONS: 0
HEADACHES: 0
BREAST MASS: 0
SHORTNESS OF BREATH: 0
HEMATOCHEZIA: 0
NERVOUS/ANXIOUS: 0
COUGH: 0
ARTHRALGIAS: 0
MYALGIAS: 0
HEARTBURN: 0
CONSTIPATION: 0
DIZZINESS: 0
FREQUENCY: 0
PARESTHESIAS: 0
WEAKNESS: 0
NAUSEA: 0
HEMATURIA: 0
SORE THROAT: 0

## 2023-11-03 NOTE — PROGRESS NOTES
"   SUBJECTIVE:   CC: Nicole is an 33 year old who presents for preventive health visit.       11/3/2023     7:11 AM   Additional Questions   Roomed by Yris FARNCO CMA       Healthy Habits:     Getting at least 3 servings of Calcium per day:  Yes    Bi-annual eye exam:  Yes    Dental care twice a year:  Yes    Sleep apnea or symptoms of sleep apnea:  Sleep apnea    Diet:  Regular (no restrictions)    Frequency of exercise:  2-3 days/week    Duration of exercise:  Less than 15 minutes    Taking medications regularly:  Yes    Medication side effects:  None    Additional concerns today:  No    Fasting for lab    Would like to have A1c checked. Endorses \"funny feeling\" in L great toe - describes as transient only when she's sitting. Resolves with moving/being active. No radicular symptoms.  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 Previously had been 280 lbs and gained 60 lbs. Feels COVID \"perpetuated some bad habits I already have.\"   Called insurance and GLP-1 not covered unless has DM. Last A1c in pre-DM range. Amenable to lab repeat.  Has made some positive changes since watching diet    Got sleep study done - was dx'd with SANNA and now feels has \"never slept so good in my life\". Feels that her cravings and thoughts about food have decreased by \"50%\" since treating this.    Went in August saw OB/GYN due to malposition of paragard IUD so had switched out for kyleena. Then developed back pain/pelvic pain and was rechecked and found this moved again so had removed and switched to nexplanon. Unfortunately, has had spotting since placement. Just light spotting, but likes to change " "frequently since prefers to keep clean. Prefers to avoid OCPs due to wanting to avoid estrogens since hasn't like how they made her feel in past and \"I'm paranoid about clots\". No personal hx of VTE.     Depression and Anxiety Follow-Up; wellbutrin 300mg daily. Overall doing ok, but lots of stress over past 6-7 months. Feels managing ok.   Requesting flexeril renewal to keep on hand - when gets super stressed will have more jaw clenching and \"locks up\"  Realized new job is not for her and switching in next few weeks.  Boyfriend also going through job change. Trying to save money to buy house.  Still has ativan on hand from past, but hasn't had to use for panic attacks in quite some time.    How are you doing with your depression since your last visit? No change  How are you doing with your anxiety since your last visit?  No change  Are you having other symptoms that might be associated with depression or anxiety? No  Have you had a significant life event? Job Concerns   Do you have any concerns with your use of alcohol or other drugs? No    Social History     Tobacco Use    Smoking status: Never    Smokeless tobacco: Never   Vaping Use    Vaping Use: Never used   Substance Use Topics    Alcohol use: Yes     Comment: Special occasions only    Drug use: No         5/24/2022     1:26 PM 9/19/2022     6:43 AM 6/22/2023    11:28 AM   PHQ   PHQ-9 Total Score 9 7 3   Q9: Thoughts of better off dead/self-harm past 2 weeks Not at all Not at all Not at all         12/8/2020     7:36 AM 2/19/2021     4:02 PM 6/22/2023    11:29 AM   FRANK-7 SCORE   Total Score 6 (mild anxiety)  2 (minimal anxiety)   Total Score 6 6 2         6/22/2023    11:28 AM   Last PHQ-9   1.  Little interest or pleasure in doing things 0   2.  Feeling down, depressed, or hopeless 0   3.  Trouble falling or staying asleep, or sleeping too much 0   4.  Feeling tired or having little energy 0   5.  Poor appetite or overeating 3   6.  Feeling bad about yourself " 0   7.  Trouble concentrating 0   8.  Moving slowly or restless 0   Q9: Thoughts of better off dead/self-harm past 2 weeks 0   PHQ-9 Total Score 3         6/22/2023    11:29 AM   FRANK-7    1. Feeling nervous, anxious, or on edge 0   2. Not being able to stop or control worrying 0   3. Worrying too much about different things 0   4. Trouble relaxing 1   5. Being so restless that it is hard to sit still 0   6. Becoming easily annoyed or irritable 1   7. Feeling afraid, as if something awful might happen 0   FRANK-7 Total Score 2   If you checked any problems, how difficult have they made it for you to do your work, take care of things at home, or get along with other people? Not difficult at all       Suicide Assessment Five-step Evaluation and Treatment (SAFE-T)        Social History     Tobacco Use    Smoking status: Never    Smokeless tobacco: Never   Substance Use Topics    Alcohol use: Yes     Comment: Special occasions only             10/27/2023    10:00 AM   Alcohol Use   Prescreen: >3 drinks/day or >7 drinks/week? No     Reviewed orders with patient.  Reviewed health maintenance and updated orders accordingly - Yes  BP Readings from Last 3 Encounters:   11/03/23 116/76   06/14/23 135/86   03/01/23 131/86    Wt Readings from Last 3 Encounters:   11/03/23 141.5 kg (312 lb)   03/01/23 144.2 kg (318 lb)   02/15/23 144.2 kg (318 lb)                  Patient Active Problem List   Diagnosis    CARDIOVASCULAR SCREENING; LDL GOAL LESS THAN 160    Generalized anxiety disorder    Obesity, morbid (H)    Hypertriglyceridemia    Major depression in complete remission (H)    Irregular periods     Segmental dysfunction of cervical region    Segmental dysfunction of thoracic region    Tension headache    Mechanical back pain    Major depression in complete remission (H24)    SANNA (obstructive sleep apnea) - on CPAP faithfully    History of clenching of mandible - during increased stress. keeps flexeril on hand for rare use     Breakthrough bleeding on Nexplanon    Nexplanon in place     Past Surgical History:   Procedure Laterality Date    wisdom teeth  2008       Social History     Tobacco Use    Smoking status: Never    Smokeless tobacco: Never   Substance Use Topics    Alcohol use: Yes     Comment: Special occasions only     Family History   Problem Relation Age of Onset    Gastrointestinal Disease Mother         born  ulcerative colitis     Hypertension Mother         with kidney disease    Diabetes Mother     Genitourinary Problems Mother         kidney transplant -?BP versus vioxx    Depression Mother     Obesity Mother     Sleep Apnea Mother     Lipids Father     Hyperlipidemia Father     Family History Negative Sister     No Known Problems Brother     Hypertension Maternal Grandmother     Depression Maternal Grandmother     Sleep Apnea Maternal Grandmother     Family History Negative Maternal Grandfather     Thyroid Disease Paternal Grandmother          78yo pulmonary fibrosis (smoker)    Heart Disease Paternal Grandfather          56yo MI after MVA    Coronary Artery Disease Paternal Grandfather     Colon Cancer Other         great maternal grandparent    Colon Cancer Maternal Uncle     Breast Cancer No family hx of          Current Outpatient Medications   Medication Sig Dispense Refill    buPROPion (WELLBUTRIN XL) 300 MG 24 hr tablet Take 1 tablet (300 mg) by mouth every morning 90 tablet 0    Calcium Carb-Cholecalciferol (CALCIUM + VITAMIN D3) 500-10 MG-MCG CHEW       cyclobenzaprine (FLEXERIL) 10 MG tablet Take 10 mg by mouth 3 times daily as needed for muscle spasms      etonogestrel (NEXPLANON) 68 MG IMPL       LORazepam (ATIVAN) 1 MG tablet Take 1 tablet (1 mg) by mouth every 8 hours as needed for anxiety 20 tablet 0    Cholecalciferol (VITAMIN D PO) Take 2,000 Units by mouth daily.      ciclopirox (LOPROX) 0.77 % cream Apply topically daily 30 g 6    paragard intrauterine copper device 1 each by  Intrauterine route once       No Known Allergies    Breast Cancer Screenin/24/2022     1:37 PM 2023     6:30 AM 10/27/2023    10:01 AM   Breast CA Risk Assessment (FHS-7)   Do you have a family history of breast, colon, or ovarian cancer? Yes Yes No / Unknown         Patient under 40 years of age: Routine Mammogram Screening not recommended.   Pertinent mammograms are reviewed under the imaging tab.    History of abnormal Pap smear: NO - age 30-65 PAP every 5 years with negative HPV co-testing recommended      Latest Ref Rng & Units 2022     9:32 AM 2019     8:36 AM 2015    10:00 AM   PAP / HPV   PAP  Negative for Intraepithelial Lesion or Malignancy (NILM)      PAP (Historical)   NIL     HPV 16 DNA Negative Negative   Negative    HPV 18 DNA Negative Negative   Negative    Other HR HPV Negative Negative   Negative      Reviewed and updated as needed this visit by clinical staff     Meds              Reviewed and updated as needed this visit by Provider                     Review of Systems   Constitutional:  Negative for chills and fever.   HENT:  Negative for congestion, ear pain, hearing loss and sore throat.    Eyes:  Negative for pain and visual disturbance.   Respiratory:  Negative for cough and shortness of breath.    Cardiovascular:  Negative for chest pain, palpitations and peripheral edema.   Gastrointestinal:  Negative for abdominal pain, constipation, diarrhea, heartburn, hematochezia and nausea.   Breasts:  Negative for tenderness, breast mass and discharge.   Genitourinary:  Positive for vaginal bleeding. Negative for dysuria, frequency, genital sores, hematuria, pelvic pain, urgency and vaginal discharge.   Musculoskeletal:  Negative for arthralgias, joint swelling and myalgias.   Skin:  Negative for rash.   Neurological:  Negative for dizziness, weakness, headaches and paresthesias.   Psychiatric/Behavioral:  Negative for mood changes. The patient is not  "nervous/anxious.          OBJECTIVE:   /76   Pulse 114   Temp 98.9  F (37.2  C) (Tympanic)   Resp 16   Ht 1.676 m (5' 6\")   Wt 141.5 kg (312 lb)   LMP  (LMP Unknown)   SpO2 99%   BMI 50.36 kg/m    Physical Exam  GENERAL: healthy, alert and no distress  EYES: Eyes grossly normal to inspection, PERRL and conjunctivae and sclerae normal  HENT: ear canals and TM's normal, nose and mouth without ulcers or lesions  NECK: no adenopathy, no asymmetry, masses, or scars and thyroid normal to palpation  RESP: lungs clear to auscultation - no rales, rhonchi or wheezes  BREAST: normal without masses, tenderness or nipple discharge and no palpable axillary masses or adenopathy  CV: regular rate and rhythm, normal S1 S2, no S3 or S4, no murmur, click or rub, no peripheral edema and peripheral pulses strong  ABDOMEN: soft, nontender, no hepatosplenomegaly, no masses and bowel sounds normal  MS: no gross musculoskeletal defects noted, no edema  SKIN: no suspicious lesions or rashes  NEURO: Normal strength and tone, mentation intact and speech normal  PSYCH: mentation appears normal, affect normal/bright    Diagnostic Test Results:  Labs reviewed in Epic    ASSESSMENT/PLAN:   Nicole was seen today for physical.    Diagnoses and all orders for this visit:    Routine general medical examination at a health care facility  -     Reviewed preventative health recommendations for age.  - REVIEW OF HEALTH MAINTENANCE PROTOCOL ORDERS  -     PRIMARY CARE FOLLOW-UP SCHEDULING; Future    Tingling  -     Transient tingling of L great toe and resolves upon repositioning, but pt wishes to ensure no progression to DM. Hx suggests this is more MSK in origin given occurs after sitting long periods for work and resolving after getting up/moving around. Planning to change jobs with hopes this will be better for her again. Encouraged to take regular breaks for movement all throughout the day. Consider imaging or PT if worsening or " persistence.  - Hemoglobin A1c; Future  -     Hemoglobin A1c    FRANK (generalized anxiety disorder)  Recurrent major depressive disorder, in full remission (H24)  -     Very stable on wellbutrin. Continue present medication.  - buPROPion (WELLBUTRIN XL) 300 MG 24 hr tablet; Take 1 tablet (300 mg) by mouth every morning    Nexplanon in place  Breakthrough bleeding on Nexplanon  -     Switched to nexplanon after migration of previous IUD. Unfortunately, continues to note break-through spotting and we reviewed this is one of the biggest reasons women elect to have it removed. Will screen CBC to rule out anemia and TSH to ensure no thyroid disease. Encouraged to follow-up with her OB/GYN as sometimes benefits from temporary txment with OCPs to stabilize uterine lining/review options. Patient in agreement with plan.   - CBC with platelets; Future  -     TSH with free T4 reflex; Future  -     CBC with platelets  -     TSH with free T4 reflex    History of clenching of mandible - during increased stress. keeps flexeril on hand for rare use  -     as above, stable. Renewed to keep on hand.  - cyclobenzaprine (FLEXERIL) 10 MG tablet; Take 1 tablet (10 mg) by mouth 3 times daily as needed for muscle spasms    SANNA (obstructive sleep apnea) - on CPAP faithfully   - Pt reports considerable improvement in sleep with CPAP use. Continue as planned.    Obesity, morbid (H)  Prediabetes   - Intolerant to phentermine and discussed GLP-1 agonist. Unfortunately, insurance will stop covering in Jan for weight loss and not currently covered under her plan unless DM. A1c in pre-DM range so will repeat labs and could consider starting if A1c now in formal DM range.    Lipid screening  -     Lipid panel reflex to direct LDL Fasting; Future  -     Lipid panel reflex to direct LDL Fasting    Screening for diabetes mellitus  -     Comprehensive metabolic panel (BMP + Alb, Alk Phos, ALT, AST, Total. Bili, TP); Future  -     Hemoglobin A1c;  "Future  -     Comprehensive metabolic panel (BMP + Alb, Alk Phos, ALT, AST, Total. Bili, TP)  -     Hemoglobin A1c    Need for tetanus booster  -     TDAP 10-64Y (ADACEL,BOOSTRIX)          COUNSELING:  Reviewed preventive health counseling, as reflected in patient instructions       Regular exercise       Healthy diet/nutrition       Immunizations  Vaccinated for: TDAP           Contraception      BMI:   Estimated body mass index is 50.36 kg/m  as calculated from the following:    Height as of this encounter: 1.676 m (5' 6\").    Weight as of this encounter: 141.5 kg (312 lb).   Weight management plan: see notes above      She reports that she has never smoked. She has never used smokeless tobacco.          Marquita Nelson PA-C  [facility] [place]  "

## 2023-11-11 NOTE — RESULT ENCOUNTER NOTE
Dear Nicole,      Your recent test results are noted below:    -Normal red blood cell (hgb) levels, normal white blood cell count and normal platelet levels.  -HDL(good) cholesterol level is low and your triglycerides are elevated which can increase your heart disease risk.  A diet high in fat and simple carbohydrates, genetics and being overweight can contribute to this. LDL(bad) cholesterol level is normal.  ADVISE:exercising 150 minutes of aerobic exercise per week (30 minutes 5 days per week or 50 minutes 3 days per week are options), and omega-3 fatty acids (fish oil) 0504-7311 mg daily are helpful to improve this.    -Liver and gallbladder tests (ALT,AST, Alk phos,bilirubin) are normal.  -Kidney function (GFR) is normal.  -Sodium is normal.  -Potassium is normal.  -Calcium is normal.  -Glucose and A1c are slightly worse from previous, but still in the pre-DM range. This should be repeated again in 6 months for monitoring. ADVISE: continued healthy lifestyle efforts with eating a low carbohydrate diet, exercising, trying to lose weight. If things continue to worsen we can always try and reconsider the GLP-1 discussion again.  -TSH (thyroid stimulating hormone) level is normal which indicates normal thyroid function.    For additional lab test information, labtestsonline.org is an excellent reference. Please contact the clinic at [phone] with any further questions or concerns.    Sincerely,      Marquita Nelson PA-C  [facility]

## 2024-05-20 NOTE — Clinical Note
Problem: Adult Inpatient Plan of Care  Goal: Absence of Hospital-Acquired Illness or Injury  Intervention: Identify and Manage Fall Risk  Recent Flowsheet Documentation  Taken 5/19/2024 2000 bEnd of Shift Summary  For vital signs and complete assessments, please see documentation flowsheets.     Pertinent assessments: /69, denies dizziness. Up ind in room and halls.     Major Shift Events None    Treatment Plan: Monitor labs, discharge today    Bedside Nurse: Frida Goode RN      Safety Promotion/Fall Prevention:   clutter free environment maintained   nonskid shoes/slippers when out of bed   safety round/check completed  Intervention: Prevent Skin Injury  Recent Flowsheet Documentation  Taken 5/19/2024 2000 by Frida Goode RN  Body Position: position changed independently  Intervention: Prevent Infection  Recent Flowsheet Documentation  Taken 5/19/2024 2000 by Frida Goode RN  Infection Prevention: hand hygiene promoted     Problem: Comorbidity Management  Goal: Maintenance of Osteoarthritis Symptom Control  Intervention: Maintain Osteoarthritis Symptom Control  Recent Flowsheet Documentation  Taken 5/19/2024 2000 by Frida Goode RN  Activity Management: up ad evie     Problem: Fall Injury Risk  Goal: Absence of Fall and Fall-Related Injury  Intervention: Promote Injury-Free Environment  Recent Flowsheet Documentation  Taken 5/19/2024 2000 by Frida Goode RN  Safety Promotion/Fall Prevention:   clutter free environment maintained   nonskid shoes/slippers when out of bed   safety round/check completed     Problem: Adult Inpatient Plan of Care  Goal: Absence of Hospital-Acquired Illness or Injury  Intervention: Identify and Manage Fall Risk  Recent Flowsheet Documentation  Taken 5/19/2024 2000 by Frida Goode RN  Safety Promotion/Fall Prevention:   clutter free environment maintained   nonskid shoes/slippers when out of bed   safety round/check completed  Intervention:  Evin Rain, Hope you are doing well.  Please find the sleep study report in epic. Pt has very severe sleep apnea with hypoxemia.  DME orders for urgent auto CPAP set up were generated and I have also generated the orders for overnight oximetry with CPAP.  Patient has follow-up with you on 3/1/23. Please let me if know any questions. Best, Snig Prevent Skin Injury  Recent Flowsheet Documentation  Taken 5/19/2024 2000 by Frida Goode RN  Body Position: position changed independently  Intervention: Prevent Infection  Recent Flowsheet Documentation  Taken 5/19/2024 2000 by Frida Goode RN  Infection Prevention: hand hygiene promoted   Goal Outcome Evaluation:

## 2024-06-25 DIAGNOSIS — G47.33 OSA (OBSTRUCTIVE SLEEP APNEA): Primary | ICD-10-CM

## 2024-07-11 ENCOUNTER — APPOINTMENT (OUTPATIENT)
Dept: URBAN - METROPOLITAN AREA CLINIC 253 | Age: 34
Setting detail: DERMATOLOGY
End: 2024-07-15

## 2024-07-11 VITALS — WEIGHT: 280 LBS | RESPIRATION RATE: 14 BRPM | HEIGHT: 66 IN

## 2024-07-11 PROBLEM — D23.39 OTHER BENIGN NEOPLASM OF SKIN OF OTHER PARTS OF FACE: Status: ACTIVE | Noted: 2024-07-11

## 2024-07-11 PROCEDURE — OTHER MIPS QUALITY: OTHER

## 2024-07-11 PROCEDURE — OTHER PHOTO-DOCUMENTATION: OTHER

## 2024-07-11 PROCEDURE — OTHER ADDITIONAL NOTES: OTHER

## 2024-07-11 PROCEDURE — 99202 OFFICE O/P NEW SF 15 MIN: CPT

## 2024-07-11 PROCEDURE — OTHER DEFER: OTHER

## 2024-07-11 PROCEDURE — OTHER COUNSELING: OTHER

## 2024-07-11 NOTE — HPI: SKIN LESIONS
Is This A New Presentation, Or A Follow-Up?: Skin Lesions
Additional History: She states the lesions on the tip of her nose have become occasionally itchy and red. Her  wanted her to have them checked. She has scratched them to the point where they scab. She presents for evaluation and management.

## 2024-07-11 NOTE — PROCEDURE: ADDITIONAL NOTES
Detail Level: Zone
Additional Notes: Discussed cautery for the smaller lesions and shave removal for the two larger lesions.\\nInformed patient that this treatment would be cosmetic.\\nPatient is going to wait to have the lesions treated until this upcoming fall.
Render Risk Assessment In Note?: no

## 2024-07-11 NOTE — PROCEDURE: DEFER
Introduction Text (Please End With A Colon): The following procedure was deferred:
Size Of Lesion In Cm (Optional): 0
Reason To Defer Override: patient is going to wait to have the lesions treated with change removal and light cautery until this fall.
Detail Level: Detailed

## 2024-07-14 ASSESSMENT — SLEEP AND FATIGUE QUESTIONNAIRES
HOW LIKELY ARE YOU TO NOD OFF OR FALL ASLEEP WHILE SITTING INACTIVE IN A PUBLIC PLACE: WOULD NEVER DOZE
HOW LIKELY ARE YOU TO NOD OFF OR FALL ASLEEP WHILE SITTING AND TALKING TO SOMEONE: WOULD NEVER DOZE
HOW LIKELY ARE YOU TO NOD OFF OR FALL ASLEEP IN A CAR, WHILE STOPPED FOR A FEW MINUTES IN TRAFFIC: WOULD NEVER DOZE
HOW LIKELY ARE YOU TO NOD OFF OR FALL ASLEEP WHILE SITTING QUIETLY AFTER LUNCH WITHOUT ALCOHOL: WOULD NEVER DOZE
HOW LIKELY ARE YOU TO NOD OFF OR FALL ASLEEP WHILE SITTING AND READING: SLIGHT CHANCE OF DOZING
HOW LIKELY ARE YOU TO NOD OFF OR FALL ASLEEP WHILE WATCHING TV: SLIGHT CHANCE OF DOZING
HOW LIKELY ARE YOU TO NOD OFF OR FALL ASLEEP WHILE LYING DOWN TO REST IN THE AFTERNOON WHEN CIRCUMSTANCES PERMIT: SLIGHT CHANCE OF DOZING
HOW LIKELY ARE YOU TO NOD OFF OR FALL ASLEEP WHEN YOU ARE A PASSENGER IN A CAR FOR AN HOUR WITHOUT A BREAK: WOULD NEVER DOZE

## 2024-07-19 ENCOUNTER — OFFICE VISIT (OUTPATIENT)
Dept: SLEEP MEDICINE | Facility: CLINIC | Age: 34
End: 2024-07-19
Payer: COMMERCIAL

## 2024-07-19 VITALS
WEIGHT: 281 LBS | BODY MASS INDEX: 45.16 KG/M2 | SYSTOLIC BLOOD PRESSURE: 123 MMHG | HEART RATE: 76 BPM | DIASTOLIC BLOOD PRESSURE: 80 MMHG | OXYGEN SATURATION: 97 % | HEIGHT: 66 IN

## 2024-07-19 DIAGNOSIS — G47.33 OSA (OBSTRUCTIVE SLEEP APNEA): Primary | ICD-10-CM

## 2024-07-19 DIAGNOSIS — E66.01 MORBID OBESITY (H): ICD-10-CM

## 2024-07-19 PROCEDURE — 99214 OFFICE O/P EST MOD 30 MIN: CPT | Performed by: PHYSICIAN ASSISTANT

## 2024-07-19 NOTE — PROGRESS NOTES
[facility]   Outpatient Sleep Medicine  Jul 19, 2024       Name: Nicole Sinclair MRN# [id number]   Age: 34 year old YOB: 1990            Assessment and Plan:   1. NEHEMIAS (obstructive sleep apnea)  Patient's sleep apnea is adequately managed and well treated with current PAP settings [id number] with low residual AHI of 0.3 events per hour. Compliance is excellent.  Continues to tolerate PAP therapy well and both daytime symptoms and nighttime sleep quality improved with use of machine. No indication to adjust settings. She will continue nightly therapy. Prescription renewed for mask/supplies.   - Comprehensive DME    2. Morbid obesity (H)  Patient has been working on weight loss and is down about 50lbs since starting her weight loss journey.  Congratulated her on progress and dicussed how weight loss should improve NEHEMIAS severity. Goal weight is ~220lb. Agreed to hold off on updating PSG today but plan to discuss next visit if weight continues to decrease/near goal to reevaluate. Likes idea of continuing with CPAP but if enough improvement with weight loss might like MAD to use with travel as long as would be successful therapy.     Nicole Sinclair will follow up in about 1 year, sooner prn.        Chief Complaint      Chief Complaint   Patient presents with    CPAP Follow Up     Follow up nehemias           History of Present Illness:     Nicole Sinclair is a 34 year old female with obesity, anxiety, depression who presents to the clinic for follow-up of their severe obstructive sleep apnea treated with CPAP.      Originally presented 11/2022 with snoring, excessive daytime sleepiness (ESS 12), witnessed apneas, enlarged neck girth (41 cm), obesity (BMI 50.18). Diagnostic PSG 2/16/23 (318#, BMI 50.5) showed severe NEHEMIAS with associated hypoxemia, no hypoventilation - AHI 92.6, RDI 92.8, Supine AHI 86.4, REM AHI 89.5, Low O2 51.0%, Time Spent ?88% 191.2 minutes / Time Spent ?89% 211.1 minutes,  "TCM with max change 38 to 44 mmHg. Sleep architecture showed all stages of sleep present with normal sleep efficiency 89.4% but with fragmentation secondary to respiratory events.  No limb movements or abnormal sleep-related behaviors.     Set up with her Resmed Airsense 11 autoCPAP on 3/7/23 through [facility]. Initially prescribed [id number] but decreased to [id number] due to aerophagia.     Last seen 6/2023 and returns today for annual PAP visit. Overall, the patient rates their experience with PAP as 10 (0 poor, 10 great). Patient is using a nasal pillow mask. The mask is comfortable. The mask is not leaking. They are not snoring with the mask on. They are not having gasp arousals.  They are not having significant oral/nasal dryness or epistaxis.  Can rarely have some redness from mask straps but no rash/skin breakdown, not overly bothersome. The pressure settings are comfortable.     Bedtime is typically 10:00PM. Usually it takes about 5 minutes to fall asleep with the mask on. Wake time is typically 5:00AM.  Patient is using PAP therapy 6.5 hours per night. The patient is usually getting 6-7 hours of sleep per night. Does feel well rested in AM.     ResMed Auto-PAP 5-11 cmH2O download (6/18/24-7/17/24):  30 total days of use. 0 nonuse days. 30 days with >4 hours use.  Average use 6 hours 28 minutes per day. Median Leak 0 L/min. 95%ile Leak 0.1 L/min. CPAP 95% pressure 10.6cm. AHI 0.3    SCALES:   INSOMNIA: Insomnia Severity Score: 2   SLEEPINESS: [place] Sleepiness Score: 3    Past medical/surgical history, family history, social history, medications and allergies were reviewed.           Physical Examination:   /80   Pulse 76   Ht 1.676 m (5' 6\")   Wt 127.5 kg (281 lb)   SpO2 97%   BMI 45.35 kg/m    General appearance: Awake, alert, cooperative. Well groomed. Sitting comfortably in chair. In no apparent distress.  HEENT: Head: Normocephalic, atraumatic. Eyes:Conjunctiva clear. Sclera normal. Nose: External " appearance without deformity.   Pulmonary:  Able to speak easily in full sentences. No cough or wheeze.   Skin:  No rashes or significant lesions on visible skin.   Neurologic: Alert, oriented x3.   Psychiatric: Mood euthymic. Affect congruent with full range and intensity.      CC:  Marquita Nelson PA-C  Jul 19, 2024     [facility]  [address] [place] [address]     [facility]  [address] [address]    Chart documentation was completed, in part, with T[employer]or [employer] voice-recognition software. Even though reviewed, some grammatical, spelling, and word errors may remain.    34 minutes spent on day of encounter doing chart review, history and exam, documentation, and further activities as noted above

## 2024-07-19 NOTE — NURSING NOTE
"Chief Complaint   Patient presents with    CPAP Follow Up     Follow up nehemias        Initial /80   Pulse 76   Ht 1.676 m (5' 6\")   Wt 127.5 kg (281 lb)   SpO2 97%   BMI 45.35 kg/m   Estimated body mass index is 45.35 kg/m  as calculated from the following:    Height as of this encounter: 1.676 m (5' 6\").    Weight as of this encounter: 127.5 kg (281 lb).    Medication Reconciliation: complete  ESS 3  STEVENSON 2  DME: CLARIBEL Woo MA      "

## 2024-09-12 ENCOUNTER — APPOINTMENT (OUTPATIENT)
Dept: URBAN - METROPOLITAN AREA CLINIC 253 | Age: 34
Setting detail: DERMATOLOGY
End: 2024-09-21

## 2024-09-12 ENCOUNTER — OFFICE VISIT (OUTPATIENT)
Dept: PODIATRY | Facility: CLINIC | Age: 34
End: 2024-09-12
Payer: COMMERCIAL

## 2024-09-12 VITALS — BODY MASS INDEX: 45.35 KG/M2 | DIASTOLIC BLOOD PRESSURE: 74 MMHG | WEIGHT: 281 LBS | SYSTOLIC BLOOD PRESSURE: 122 MMHG

## 2024-09-12 VITALS — HEIGHT: 66 IN | WEIGHT: 280 LBS | RESPIRATION RATE: 14 BRPM

## 2024-09-12 DIAGNOSIS — L60.0 INGROWN NAIL OF GREAT TOE OF LEFT FOOT: ICD-10-CM

## 2024-09-12 DIAGNOSIS — M79.672 LEFT FOOT PAIN: Primary | ICD-10-CM

## 2024-09-12 PROBLEM — D23.39 OTHER BENIGN NEOPLASM OF SKIN OF OTHER PARTS OF FACE: Status: ACTIVE | Noted: 2024-09-12

## 2024-09-12 PROBLEM — D23.0 OTHER BENIGN NEOPLASM OF SKIN OF LIP: Status: ACTIVE | Noted: 2024-09-12

## 2024-09-12 PROCEDURE — 11750 EXCISION NAIL&NAIL MATRIX: CPT | Mod: TA | Performed by: PODIATRIST

## 2024-09-12 PROCEDURE — OTHER BENIGN DESTRUCTION COSMETIC: OTHER

## 2024-09-12 RX ORDER — SILVER SULFADIAZINE 10 MG/G
CREAM TOPICAL 2 TIMES DAILY
Qty: 25 G | Refills: 1 | Status: SHIPPED | OUTPATIENT
Start: 2024-09-12

## 2024-09-12 NOTE — PROCEDURE: BENIGN DESTRUCTION COSMETIC
Consent: The patient's consent was obtained including but not limited to risks of crusting, scabbing, blistering, scarring, darker or lighter pigmentary change, recurrence, incomplete removal and infection.
Price (Use Numbers Only, No Special Characters Or $): 300
Detail Level: Detailed
Post-Care Instructions: I reviewed with the patient in detail post-care instructions. Patient is to wear sunprotection, and avoid picking at any of the treated lesions. Pt may apply Vaseline to crusted or scabbing areas.
Topical Anesthesia?: 20% benzocaine, 10% lidocaine, 10% tetracaine

## 2024-09-12 NOTE — PATIENT INSTRUCTIONS
Thank you for choosing [facility] Podiatry / Foot & Ankle Surgery!    DR KLINE'S CLINIC:  [facility]   [address]   [place], [place] [address] [phone]    (Tues, Wed, Thur am, Fri pm)     [facility]  [address], [place] [address]  [phone]    (Friday mornings)     TRIAGE LINE: [phone]  APPOINTMENTS: [phone]  RADIOLOGY: [phone]  SET UP SURGERY: [phone]  PHYSICAL THERAPY: [phone]   FAX NUMBER: [phone]  BILLING QUESTIONS: [phone]       Follow up: As needed      INGROWN TOENAILS  When a toenail is ingrown, it is curved and grows into the skin, usually at the nail borders (the sides of the nail). This  digging in  of the nail irritates the skin, often creating pain, redness, swelling, and warmth in the toe.  If an ingrown nail causes a break in the skin, bacteria may enter and cause an infection in the area, which is often marked by drainage and a foul odor. However, even if the toe isn t painful, red, swollen, or warm, a nail that curves downward into the skin can progress to an infection.  CAUSES:  Heredity: In many people, the tendency for ingrown toenails is inherited.   Trauma: Sometimes an ingrown toenail is the result of trauma, such as stubbing your toe, having an object fall on your toe, or engaging in activities that involve repeated pressure on the toes, such as kicking or running.   Improper Trimming:  The most common cause of ingrown toenails is cutting your nails too short. This encourages the skin next to the nail to fold over the nail.   Improperly Sized Footwear: Ingrown toenails can result from wearing socks and shoes that are tight or short.   Nail Conditions: Ingrown toenails can be caused by nail problems, such as fungal infections or losing a nail due to trauma.   TREATMENT: Sometimes initial treatment for ingrown toenails can be safely performed at home. However, home treatment is strongly  discouraged if an infection is suspected, or for those who have medical conditions that put feet at high risk, such as diabetes, nerve damage in the foot, or poor circulation.  Home care: If you don t have an infection or any of the above medical conditions, you can soak your foot in room-temperature water (adding Epsom s salt may be recommended by your doctor), and gently massage the side of the nail fold to help reduce the inflammation.  Avoid attempting  bathroom surgery.  Repeated cutting of the nail can cause the condition to worsen over time. If your symptoms fail to improve, it s time to see a foot and ankle surgeon.  Physician care: After examining the toe, the foot and ankle surgeon will select the treatment best suited for you. If an infection is present, an oral antibiotic may be prescribed.  Sometimes a minor surgical procedure, often performed in the office, will ease the pain and remove the offending nail. After applying a local anesthetic, the doctor removes part of the nail s side border. Some nails may become ingrown again, requiring removal of the nail root.  Following the nail procedure, a light bandage will be applied. Most people experience very little pain after surgery and may resume normal activity the next day. If your surgeon has prescribed an oral antibiotic, be sure to take all the medication, even if your symptoms have improved.  PREVENTION:  Proper Trimming: Cut toenails in a fairly straight line, and don t cut them too short. You should be able to get your fingernail under the sides and end of the nail.   Well-fitting Footwear: Don t wear shoes that are short or tight in the toe area. Avoid shoes that are loose, because they too cause pressure on the toes, especially when running or walking briskly.     INGROWN TOENAIL REMOVAL AFTERCARE   Go directly home and elevate the affected foot on one or two pillows for the remainder of the day/evening if possible. Your toe may stay numb  anywhere from 2-8 hours.   Take Tylenol, ibuprofen or another anti-inflammatory as needed for pain.   Take antibiotic if that has been prescribed. Finish the entire prescribed antibiotic even if your symptoms have improved.   The evening of the procedure, soak/wash the affected area in warm water (you may add Epsom salt) for 5 to 10 minutes. Do this twice a day for 2-4 weeks (6-8 weeks if you had phenol) (you may count showering/bathing as one soak).  After soaks, pat the area dry and then allow to airdry for a few minutes. Apply antibiotic ointment to the area and cover with 2 X 2 gauze and paper tape or band-aid.  You may pursue everyday activities as tolerated with either an open toe shoe or cut-out shoe as needed or you may wear regular shoes if no pain is noted.  Watch for any signs and symptoms of infection such as: redness, red streaks going up the foot/leg, swelling, pus or foul odor. Those that have had the phenol procedure, the toe will drain longer and will look like it is infected because it is a chemical burn.   Please call with questions.

## 2024-09-12 NOTE — PROGRESS NOTES
Podiatry / Foot and Ankle Surgery Progress Note    September 12, 2024    Subject: Patient was seen for recurrent ingrown nail to the left great toe.  Notes that she has had the permanent procedures on the other sides of the big toenails but not this 1 side.  She is wondering about getting that done as it continues to become ingrown.  Pain can be 5 out of 10 at its worst.  Denies specific injury.  Denies fever, nausea, vomiting.    Objective:  Vitals: /74   Wt 127.5 kg (281 lb)   BMI 45.35 kg/m    BMI= Body mass index is 45.35 kg/m .      General:  Patient is alert and orientated.  NAD.    Vascular:  DP and PT pulses are palpable.  No edema or varicosities noted.  CFT's < 3secs.  Skin temp is normal.    Neuro:  Light and gross touch sensation intact to digits, dorsum, and plantar aspects of the feet.    Derm: Medial border of left great toenail is incurvated.  Localized redness and pain on palpation.    Musculoskeletal:  No foot deformity noted.      Assessment:    Left foot pain  Ingrown nail of great toe of left foot    Medical Decision Making/Plan:  The potential causes and nature of an ingrown toenail were discussed with the patient.  We reviewed the natural history/prognosis of the condition and potential risks if no treatment is provided.      Treatment options discussed included conservative management (oral antibiotics, soaking of foot, adequate width shoes)  as well as surgical management (partial or total nail removal).  The pros and cons of both forms of treatment were reviewed.      After thorough discussion and answering all questions, the patient elected to have the border removed permanently.  She will soak the foot twice a day for 6 weeks and apply Silvadene cream and a bandage.  All questions were answered to patient's satisfaction and she will call further questions or concerns..     Procedure: After verbal consent, the left big toe was anesthetized with 5cc's of 1% lidocaine plain. A  tourniquet was applied to the toe. The medial border was then raised from the nail bed and then cut the length of the nail.  The offending nail border was then removed.  Three 30 second applications of phenol were applied to the nail bed and nail matrix.  The area was then flushed with copious amounts of alcohol.  Bacitracin was applied to the nail bed.  The tourniquet was removed.  Bandage was applied to the toe.  The patient tolerated the procedure and anesthesia well.      Patient Risk Factor:  Patient is a low risk factor for infection.     Brittany Blanco DPM, Podiatry/Foot and Ankle Surgery

## 2024-09-12 NOTE — LETTER
9/12/2024      Nicole Sinclair  [address]  [place] [address]      Dear Colleague,    Thank you for referring your patient, Nicole Sinclair, to the [facility]. Please see a copy of my visit note below.    Podiatry / Foot and Ankle Surgery Progress Note    September 12, 2024    Subject: Patient was seen for recurrent ingrown nail to the left great toe.  Notes that she has had the permanent procedures on the other sides of the big toenails but not this 1 side.  She is wondering about getting that done as it continues to become ingrown.  Pain can be 5 out of 10 at its worst.  Denies specific injury.  Denies fever, nausea, vomiting.    Objective:  Vitals: /74   Wt 127.5 kg (281 lb)   BMI 45.35 kg/m    BMI= Body mass index is 45.35 kg/m .      General:  Patient is alert and orientated.  NAD.    Vascular:  DP and PT pulses are palpable.  No edema or varicosities noted.  CFT's < 3secs.  Skin temp is normal.    Neuro:  Light and gross touch sensation intact to digits, dorsum, and plantar aspects of the feet.    Derm: Medial border of left great toenail is incurvated.  Localized redness and pain on palpation.    Musculoskeletal:  No foot deformity noted.      Assessment:    Left foot pain  Ingrown nail of great toe of left foot    Medical Decision Making/Plan:  The potential causes and nature of an ingrown toenail were discussed with the patient.  We reviewed the natural history/prognosis of the condition and potential risks if no treatment is provided.      Treatment options discussed included conservative management (oral antibiotics, soaking of foot, adequate width shoes)  as well as surgical management (partial or total nail removal).  The pros and cons of both forms of treatment were reviewed.      After thorough discussion and answering all questions, the patient elected to have the border removed permanently.  She will soak the foot twice a day for 6 weeks  and apply Silvadene cream and a bandage.  All questions were answered to patient's satisfaction and she will call further questions or concerns..     Procedure: After verbal consent, the left big toe was anesthetized with 5cc's of 1% lidocaine plain. A tourniquet was applied to the toe. The medial border was then raised from the nail bed and then cut the length of the nail.  The offending nail border was then removed.  Three 30 second applications of phenol were applied to the nail bed and nail matrix.  The area was then flushed with copious amounts of alcohol.  Bacitracin was applied to the nail bed.  The tourniquet was removed.  Bandage was applied to the toe.  The patient tolerated the procedure and anesthesia well.      Patient Risk Factor:  Patient is a low risk factor for infection.     Brittany Blanco DPM, Podiatry/Foot and Ankle Surgery      Again, thank you for allowing me to participate in the care of your patient.        Sincerely,        Brittany Blanco DPM, Podiatry/Foot and Ankle Surgery

## 2024-12-03 ASSESSMENT — PATIENT HEALTH QUESTIONNAIRE - PHQ9
SUM OF ALL RESPONSES TO PHQ QUESTIONS 1-9: 2
10. IF YOU CHECKED OFF ANY PROBLEMS, HOW DIFFICULT HAVE THESE PROBLEMS MADE IT FOR YOU TO DO YOUR WORK, TAKE CARE OF THINGS AT HOME, OR GET ALONG WITH OTHER PEOPLE: NOT DIFFICULT AT ALL
SUM OF ALL RESPONSES TO PHQ QUESTIONS 1-9: 2

## 2024-12-04 ENCOUNTER — VIRTUAL VISIT (OUTPATIENT)
Dept: FAMILY MEDICINE | Facility: CLINIC | Age: 34
End: 2024-12-04
Payer: COMMERCIAL

## 2024-12-04 DIAGNOSIS — F41.1 GAD (GENERALIZED ANXIETY DISORDER): ICD-10-CM

## 2024-12-04 DIAGNOSIS — E66.01 OBESITY, MORBID (H): ICD-10-CM

## 2024-12-04 DIAGNOSIS — F33.42 RECURRENT MAJOR DEPRESSIVE DISORDER, IN FULL REMISSION (H): ICD-10-CM

## 2024-12-04 PROCEDURE — G2211 COMPLEX E/M VISIT ADD ON: HCPCS | Mod: 95 | Performed by: NURSE PRACTITIONER

## 2024-12-04 PROCEDURE — 99213 OFFICE O/P EST LOW 20 MIN: CPT | Mod: 95 | Performed by: NURSE PRACTITIONER

## 2024-12-04 RX ORDER — BUPROPION HYDROCHLORIDE 300 MG/1
300 TABLET ORAL EVERY MORNING
Qty: 90 TABLET | Refills: 1 | Status: SHIPPED | OUTPATIENT
Start: 2024-12-04

## 2024-12-04 ASSESSMENT — ANXIETY QUESTIONNAIRES
1. FEELING NERVOUS, ANXIOUS, OR ON EDGE: SEVERAL DAYS
3. WORRYING TOO MUCH ABOUT DIFFERENT THINGS: NOT AT ALL
GAD7 TOTAL SCORE: 3
GAD7 TOTAL SCORE: 3
4. TROUBLE RELAXING: SEVERAL DAYS
2. NOT BEING ABLE TO STOP OR CONTROL WORRYING: NOT AT ALL
8. IF YOU CHECKED OFF ANY PROBLEMS, HOW DIFFICULT HAVE THESE MADE IT FOR YOU TO DO YOUR WORK, TAKE CARE OF THINGS AT HOME, OR GET ALONG WITH OTHER PEOPLE?: SOMEWHAT DIFFICULT
7. FEELING AFRAID AS IF SOMETHING AWFUL MIGHT HAPPEN: NOT AT ALL
5. BEING SO RESTLESS THAT IT IS HARD TO SIT STILL: NOT AT ALL
IF YOU CHECKED OFF ANY PROBLEMS ON THIS QUESTIONNAIRE, HOW DIFFICULT HAVE THESE PROBLEMS MADE IT FOR YOU TO DO YOUR WORK, TAKE CARE OF THINGS AT HOME, OR GET ALONG WITH OTHER PEOPLE: SOMEWHAT DIFFICULT
6. BECOMING EASILY ANNOYED OR IRRITABLE: SEVERAL DAYS

## 2024-12-04 NOTE — PROGRESS NOTES
"Nicole is a 34 year old who is being evaluated via a billable video visit.    How would you like to obtain your AVS? MyChart  If the video visit is dropped, the invitation should be resent by: Text to cell phone: [phone]  Will anyone else be joining your video visit? No    Assessment & Plan     Nicole was seen today for refill request.    Diagnoses and all orders for this visit:    FRANK (generalized anxiety disorder)  Recurrent major depressive disorder, in full remission (H)  Currently stable on Wellbutrin  mg daily.    -     buPROPion (WELLBUTRIN XL) 300 MG 24 hr tablet; Take 1 tablet (300 mg) by mouth every morning.    Obesity, morbid (H)  Continue to work with patient regarding dietary and lifestyle modifications to support weight loss.      The longitudinal plan of care for the diagnosis(es)/condition(s) as documented were addressed during this visit. Due to the added complexity in care, I will continue to support Nicole in the subsequent management and with ongoing continuity of care.      BMI  Estimated body mass index is 45.35 kg/m  as calculated from the following:    Height as of 7/19/24: 1.676 m (5' 6\").    Weight as of 9/12/24: 127.5 kg (281 lb).     Return in about 2 months (around 2/4/2025) for Preventative Visit, Fasting labs.  Planning to establish care with new provider in [place] after their move.          Subjective   Nicole is a 34 year old, presenting for the following health issues:  Refill Request        12/4/2024     2:07 PM   Additional Questions   Roomed by jewel     History of Present Illness       Reason for visit:  Prescription refill   She is taking medications regularly.     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  No children.  1 dog  Partner got a new job in [place] and they will be moving in February.      Prediabetes  Is working out 3 times weekly.   Hemoglobin A1C   Date Value Ref Range Status   11/03/2023 " 6.3 (H) 0.0 - 5.6 % Final     Comment:     Normal <5.7%   Prediabetes 5.7-6.4%    Diabetes 6.5% or higher     Note: Adopted from ADA consensus guidelines.         Stable on Wellbutrin  mg daily.  Tried to taper off while in nursing school and this didn't go well.    Is currently happy with dose and regimen.          9/19/2022     6:43 AM 6/22/2023    11:28 AM 12/3/2024     9:29 AM   PHQ   PHQ-9 Total Score 7 3 2    Q9: Thoughts of better off dead/self-harm past 2 weeks Not at all  Not at all  Not at all        Patient-reported         2/19/2021     4:02 PM 6/22/2023    11:29 AM 12/4/2024     2:12 PM   FRANK-7 SCORE   Total Score  2 (minimal anxiety) 3 (minimal anxiety)   Total Score 6 2 3        Patient-reported               Review of Systems  Constitutional, HEENT, cardiovascular, pulmonary, gi and gu systems are negative, except as otherwise noted.      Objective           Vitals:  No vitals were obtained today due to virtual visit.    Physical Exam   GENERAL: alert and no distress  PSYCH: Appropriate affect, tone, and pace of words          Video-Visit Details    Type of service:  Video Visit   Originating Location (pt. Location): Home  Distant Location (provider location):  On-site  Platform used for Video Visit: Makenzie    Signed Electronically by: HAKEEM Gomez CNP

## 2024-12-29 ENCOUNTER — HEALTH MAINTENANCE LETTER (OUTPATIENT)
Age: 34
End: 2024-12-29

## 2025-09-02 DIAGNOSIS — F41.1 GAD (GENERALIZED ANXIETY DISORDER): ICD-10-CM

## 2025-09-02 DIAGNOSIS — F33.42 RECURRENT MAJOR DEPRESSIVE DISORDER, IN FULL REMISSION: ICD-10-CM

## 2025-09-02 RX ORDER — BUPROPION HYDROCHLORIDE 300 MG/1
300 TABLET ORAL EVERY MORNING
Qty: 90 TABLET | Refills: 1 | Status: SHIPPED | OUTPATIENT
Start: 2025-09-02